# Patient Record
Sex: FEMALE | Race: WHITE | NOT HISPANIC OR LATINO | Employment: OTHER | URBAN - METROPOLITAN AREA
[De-identification: names, ages, dates, MRNs, and addresses within clinical notes are randomized per-mention and may not be internally consistent; named-entity substitution may affect disease eponyms.]

---

## 2019-09-24 ENCOUNTER — RECORDS - HEALTHEAST (OUTPATIENT)
Dept: LAB | Facility: CLINIC | Age: 84
End: 2019-09-24

## 2019-09-24 LAB
ANION GAP SERPL CALCULATED.3IONS-SCNC: 8 MMOL/L (ref 5–18)
BUN SERPL-MCNC: 13 MG/DL (ref 8–28)
CALCIUM SERPL-MCNC: 8.5 MG/DL (ref 8.5–10.5)
CHLORIDE BLD-SCNC: 106 MMOL/L (ref 98–107)
CO2 SERPL-SCNC: 21 MMOL/L (ref 22–31)
CREAT SERPL-MCNC: 0.87 MG/DL (ref 0.6–1.1)
ERYTHROCYTE [DISTWIDTH] IN BLOOD BY AUTOMATED COUNT: 15.8 % (ref 11–14.5)
FERRITIN SERPL-MCNC: 217 NG/ML (ref 10–130)
FOLATE SERPL-MCNC: 9.6 NG/ML
GFR SERPL CREATININE-BSD FRML MDRD: >60 ML/MIN/1.73M2
GLUCOSE BLD-MCNC: 91 MG/DL (ref 70–125)
HCT VFR BLD AUTO: 26.6 % (ref 35–47)
HGB BLD-MCNC: 8.2 G/DL (ref 12–16)
IRON SATN MFR SERPL: 17 % (ref 20–50)
IRON SERPL-MCNC: 32 UG/DL (ref 42–175)
MCH RBC QN AUTO: 31.1 PG (ref 27–34)
MCHC RBC AUTO-ENTMCNC: 30.8 G/DL (ref 32–36)
MCV RBC AUTO: 101 FL (ref 80–100)
PLATELET # BLD AUTO: 427 THOU/UL (ref 140–440)
PMV BLD AUTO: 8.8 FL (ref 8.5–12.5)
POTASSIUM BLD-SCNC: 3.6 MMOL/L (ref 3.5–5)
RBC # BLD AUTO: 2.64 MILL/UL (ref 3.8–5.4)
SODIUM SERPL-SCNC: 135 MMOL/L (ref 136–145)
TIBC SERPL-MCNC: 189 UG/DL (ref 313–563)
TRANSFERRIN SERPL-MCNC: 151 MG/DL (ref 212–360)
VIT B12 SERPL-MCNC: 1223 PG/ML (ref 213–816)
WBC: 9.9 THOU/UL (ref 4–11)

## 2019-10-02 ENCOUNTER — RECORDS - HEALTHEAST (OUTPATIENT)
Dept: LAB | Facility: CLINIC | Age: 84
End: 2019-10-02

## 2019-10-02 LAB — VIT B12 SERPL-MCNC: 706 PG/ML (ref 213–816)

## 2019-11-25 ENCOUNTER — AMBULATORY - HEALTHEAST (OUTPATIENT)
Dept: OTHER | Facility: CLINIC | Age: 84
End: 2019-11-25

## 2019-12-18 ENCOUNTER — RECORDS - HEALTHEAST (OUTPATIENT)
Dept: LAB | Facility: CLINIC | Age: 84
End: 2019-12-18

## 2019-12-18 LAB
ANION GAP SERPL CALCULATED.3IONS-SCNC: 5 MMOL/L (ref 5–18)
BUN SERPL-MCNC: 22 MG/DL (ref 8–28)
CALCIUM SERPL-MCNC: 9.1 MG/DL (ref 8.5–10.5)
CHLORIDE BLD-SCNC: 108 MMOL/L (ref 98–107)
CHOLEST SERPL-MCNC: 142 MG/DL
CO2 SERPL-SCNC: 24 MMOL/L (ref 22–31)
CREAT SERPL-MCNC: 1.01 MG/DL (ref 0.6–1.1)
ERYTHROCYTE [DISTWIDTH] IN BLOOD BY AUTOMATED COUNT: 14.8 % (ref 11–14.5)
FASTING STATUS PATIENT QL REPORTED: NORMAL
FERRITIN SERPL-MCNC: 74 NG/ML (ref 10–130)
GFR SERPL CREATININE-BSD FRML MDRD: 51 ML/MIN/1.73M2
GLUCOSE BLD-MCNC: 94 MG/DL (ref 70–125)
HCT VFR BLD AUTO: 32.3 % (ref 35–47)
HDLC SERPL-MCNC: 55 MG/DL
HGB BLD-MCNC: 9.9 G/DL (ref 12–16)
LDLC SERPL CALC-MCNC: 61 MG/DL
MCH RBC QN AUTO: 31.5 PG (ref 27–34)
MCHC RBC AUTO-ENTMCNC: 30.7 G/DL (ref 32–36)
MCV RBC AUTO: 103 FL (ref 80–100)
PLATELET # BLD AUTO: 236 THOU/UL (ref 140–440)
PMV BLD AUTO: 9.9 FL (ref 8.5–12.5)
POTASSIUM BLD-SCNC: 4.4 MMOL/L (ref 3.5–5)
RBC # BLD AUTO: 3.14 MILL/UL (ref 3.8–5.4)
SODIUM SERPL-SCNC: 137 MMOL/L (ref 136–145)
TRIGL SERPL-MCNC: 128 MG/DL
TSH SERPL DL<=0.005 MIU/L-ACNC: 1.22 UIU/ML (ref 0.3–5)
VIT B12 SERPL-MCNC: 314 PG/ML (ref 213–816)
WBC: 6.3 THOU/UL (ref 4–11)

## 2020-01-27 ENCOUNTER — AMBULATORY - HEALTHEAST (OUTPATIENT)
Dept: OTHER | Facility: CLINIC | Age: 85
End: 2020-01-27

## 2020-07-29 ENCOUNTER — RECORDS - HEALTHEAST (OUTPATIENT)
Dept: LAB | Facility: CLINIC | Age: 85
End: 2020-07-29

## 2020-07-30 LAB
ANION GAP SERPL CALCULATED.3IONS-SCNC: 9 MMOL/L (ref 5–18)
BUN SERPL-MCNC: 18 MG/DL (ref 8–28)
CALCIUM SERPL-MCNC: 8.8 MG/DL (ref 8.5–10.5)
CHLORIDE BLD-SCNC: 100 MMOL/L (ref 98–107)
CO2 SERPL-SCNC: 22 MMOL/L (ref 22–31)
CREAT SERPL-MCNC: 0.99 MG/DL (ref 0.6–1.1)
ERYTHROCYTE [DISTWIDTH] IN BLOOD BY AUTOMATED COUNT: 13.4 % (ref 11–14.5)
FERRITIN SERPL-MCNC: 123 NG/ML (ref 10–130)
GFR SERPL CREATININE-BSD FRML MDRD: 53 ML/MIN/1.73M2
GLUCOSE BLD-MCNC: 80 MG/DL (ref 70–125)
HCT VFR BLD AUTO: 32.1 % (ref 35–47)
HGB BLD-MCNC: 10.2 G/DL (ref 12–16)
MCH RBC QN AUTO: 31.8 PG (ref 27–34)
MCHC RBC AUTO-ENTMCNC: 31.8 G/DL (ref 32–36)
MCV RBC AUTO: 100 FL (ref 80–100)
PLATELET # BLD AUTO: 207 THOU/UL (ref 140–440)
PMV BLD AUTO: 9.5 FL (ref 8.5–12.5)
POTASSIUM BLD-SCNC: 5.1 MMOL/L (ref 3.5–5)
RBC # BLD AUTO: 3.21 MILL/UL (ref 3.8–5.4)
SODIUM SERPL-SCNC: 131 MMOL/L (ref 136–145)
VIT B12 SERPL-MCNC: 296 PG/ML (ref 213–816)
WBC: 5.1 THOU/UL (ref 4–11)

## 2021-03-30 ENCOUNTER — RECORDS - HEALTHEAST (OUTPATIENT)
Dept: LAB | Facility: CLINIC | Age: 86
End: 2021-03-30

## 2021-03-31 LAB
ANION GAP SERPL CALCULATED.3IONS-SCNC: 9 MMOL/L (ref 5–18)
BUN SERPL-MCNC: 20 MG/DL (ref 8–28)
CALCIUM SERPL-MCNC: 8.7 MG/DL (ref 8.5–10.5)
CHLORIDE BLD-SCNC: 109 MMOL/L (ref 98–107)
CO2 SERPL-SCNC: 22 MMOL/L (ref 22–31)
CREAT SERPL-MCNC: 1.05 MG/DL (ref 0.6–1.1)
ERYTHROCYTE [DISTWIDTH] IN BLOOD BY AUTOMATED COUNT: 13.7 % (ref 11–14.5)
FERRITIN SERPL-MCNC: 231 NG/ML (ref 10–130)
GFR SERPL CREATININE-BSD FRML MDRD: 49 ML/MIN/1.73M2
GLUCOSE BLD-MCNC: 84 MG/DL (ref 70–125)
HCT VFR BLD AUTO: 35.2 % (ref 35–47)
HGB BLD-MCNC: 11.1 G/DL (ref 12–16)
MCH RBC QN AUTO: 32.8 PG (ref 27–34)
MCHC RBC AUTO-ENTMCNC: 31.5 G/DL (ref 32–36)
MCV RBC AUTO: 104 FL (ref 80–100)
PLATELET # BLD AUTO: 226 THOU/UL (ref 140–440)
PMV BLD AUTO: 10 FL (ref 8.5–12.5)
POTASSIUM BLD-SCNC: 4.4 MMOL/L (ref 3.5–5)
RBC # BLD AUTO: 3.38 MILL/UL (ref 3.8–5.4)
SODIUM SERPL-SCNC: 140 MMOL/L (ref 136–145)
VIT B12 SERPL-MCNC: 288 PG/ML (ref 213–816)
WBC: 7 THOU/UL (ref 4–11)

## 2021-06-16 PROBLEM — E03.9 ACQUIRED HYPOTHYROIDISM: Status: ACTIVE | Noted: 2019-10-19

## 2021-06-16 PROBLEM — F03.90 DEMENTIA (H): Status: ACTIVE | Noted: 2020-01-21

## 2021-06-16 PROBLEM — S73.004A HIP DISLOCATION, RIGHT, INITIAL ENCOUNTER (H): Status: ACTIVE | Noted: 2020-01-20

## 2021-06-16 PROBLEM — I63.9 ACUTE CVA (CEREBROVASCULAR ACCIDENT) (H): Status: ACTIVE | Noted: 2019-11-23

## 2021-06-16 PROBLEM — N18.30 CHRONIC KIDNEY DISEASE, STAGE III (MODERATE) (H): Status: ACTIVE | Noted: 2017-08-17

## 2021-06-16 PROBLEM — I63.9 ISCHEMIC STROKE (H): Status: ACTIVE | Noted: 2019-11-23

## 2021-06-16 PROBLEM — R09.02 HYPOXIA: Status: ACTIVE | Noted: 2020-01-20

## 2021-06-16 PROBLEM — K62.89 PROCTITIS: Status: ACTIVE | Noted: 2019-10-19

## 2021-06-16 PROBLEM — Z96.649 PERIPROSTHETIC FRACTURE OF PROXIMAL END OF FEMUR: Status: ACTIVE | Noted: 2019-09-13

## 2021-06-16 PROBLEM — A41.89 SEPSIS DUE TO OTHER ETIOLOGY (H): Status: ACTIVE | Noted: 2019-10-19

## 2021-06-16 PROBLEM — M97.8XXA PERIPROSTHETIC FRACTURE OF PROXIMAL END OF FEMUR: Status: ACTIVE | Noted: 2019-09-13

## 2021-06-16 PROBLEM — Z66 DNR (DO NOT RESUSCITATE): Status: ACTIVE | Noted: 2019-07-31

## 2021-06-16 PROBLEM — D53.9 MACROCYTIC ANEMIA: Status: ACTIVE | Noted: 2019-10-19

## 2021-07-30 ENCOUNTER — HOSPITAL ENCOUNTER (EMERGENCY)
Facility: HOSPITAL | Age: 86
Discharge: NURSING, REHAB OR LONG-TERM CARE | End: 2021-07-30
Attending: EMERGENCY MEDICINE | Admitting: EMERGENCY MEDICINE
Payer: COMMERCIAL

## 2021-07-30 ENCOUNTER — APPOINTMENT (OUTPATIENT)
Dept: CT IMAGING | Facility: HOSPITAL | Age: 86
End: 2021-07-30
Attending: EMERGENCY MEDICINE
Payer: COMMERCIAL

## 2021-07-30 VITALS
SYSTOLIC BLOOD PRESSURE: 163 MMHG | RESPIRATION RATE: 16 BRPM | HEART RATE: 54 BPM | TEMPERATURE: 97.8 F | DIASTOLIC BLOOD PRESSURE: 60 MMHG | OXYGEN SATURATION: 97 %

## 2021-07-30 DIAGNOSIS — S01.01XA LACERATION OF SCALP, INITIAL ENCOUNTER: ICD-10-CM

## 2021-07-30 DIAGNOSIS — N30.00 ACUTE CYSTITIS WITHOUT HEMATURIA: ICD-10-CM
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
## 2021-07-30 LAB
ALBUMIN UR-MCNC: NEGATIVE MG/DL
ANION GAP SERPL CALCULATED.3IONS-SCNC: 5 MMOL/L (ref 5–18)
APPEARANCE UR: CLEAR
BACTERIA #/AREA URNS HPF: ABNORMAL /HPF
BASOPHILS # BLD AUTO: 0 10E3/UL (ref 0–0.2)
BASOPHILS NFR BLD AUTO: 1 %
BILIRUB UR QL STRIP: NEGATIVE
BUN SERPL-MCNC: 18 MG/DL (ref 8–28)
CALCIUM SERPL-MCNC: 8.9 MG/DL (ref 8.5–10.5)
CHLORIDE BLD-SCNC: 110 MMOL/L (ref 98–107)
CO2 SERPL-SCNC: 28 MMOL/L (ref 22–31)
COLOR UR AUTO: COLORLESS
CREAT SERPL-MCNC: 1.03 MG/DL (ref 0.6–1.1)
EOSINOPHIL # BLD AUTO: 0.3 10E3/UL (ref 0–0.7)
EOSINOPHIL NFR BLD AUTO: 5 %
ERYTHROCYTE [DISTWIDTH] IN BLOOD BY AUTOMATED COUNT: 13.2 % (ref 10–15)
GFR SERPL CREATININE-BSD FRML MDRD: 48 ML/MIN/1.73M2
GLUCOSE BLD-MCNC: 92 MG/DL (ref 70–125)
GLUCOSE UR STRIP-MCNC: NEGATIVE MG/DL
HCT VFR BLD AUTO: 33.7 % (ref 35–47)
HGB BLD-MCNC: 10.3 G/DL (ref 11.7–15.7)
HGB UR QL STRIP: NEGATIVE
IMM GRANULOCYTES # BLD: 0 10E3/UL
IMM GRANULOCYTES NFR BLD: 1 %
INR PPP: 1.02 (ref 0.9–1.15)
KETONES UR STRIP-MCNC: NEGATIVE MG/DL
LEUKOCYTE ESTERASE UR QL STRIP: ABNORMAL
LYMPHOCYTES # BLD AUTO: 1.4 10E3/UL (ref 0.8–5.3)
LYMPHOCYTES NFR BLD AUTO: 23 %
MCH RBC QN AUTO: 32.3 PG (ref 26.5–33)
MCHC RBC AUTO-ENTMCNC: 30.6 G/DL (ref 31.5–36.5)
MCV RBC AUTO: 106 FL (ref 78–100)
MONOCYTES # BLD AUTO: 0.5 10E3/UL (ref 0–1.3)
MONOCYTES NFR BLD AUTO: 8 %
NEUTROPHILS # BLD AUTO: 3.8 10E3/UL (ref 1.6–8.3)
NEUTROPHILS NFR BLD AUTO: 62 %
NITRATE UR QL: POSITIVE
NRBC # BLD AUTO: 0 10E3/UL
NRBC BLD AUTO-RTO: 0 /100
PH UR STRIP: 6 [PH] (ref 5–7)
PLATELET # BLD AUTO: 191 10E3/UL (ref 150–450)
POTASSIUM BLD-SCNC: 4.1 MMOL/L (ref 3.5–5)
RBC # BLD AUTO: 3.19 10E6/UL (ref 3.8–5.2)
RBC URINE: <1 /HPF
SODIUM SERPL-SCNC: 143 MMOL/L (ref 136–145)
SP GR UR STRIP: 1.01 (ref 1–1.03)
TROPONIN I SERPL-MCNC: 0.02 NG/ML (ref 0–0.29)
UROBILINOGEN UR STRIP-MCNC: <2 MG/DL
WBC # BLD AUTO: 6 10E3/UL (ref 4–11)
WBC URINE: 21 /HPF

## 2021-07-30 PROCEDURE — 36415 COLL VENOUS BLD VENIPUNCTURE: CPT | Performed by: EMERGENCY MEDICINE

## 2021-07-30 PROCEDURE — 72125 CT NECK SPINE W/O DYE: CPT

## 2021-07-30 PROCEDURE — 99285 EMERGENCY DEPT VISIT HI MDM: CPT | Mod: 25

## 2021-07-30 PROCEDURE — 250N000013 HC RX MED GY IP 250 OP 250 PS 637: Performed by: STUDENT IN AN ORGANIZED HEALTH CARE EDUCATION/TRAINING PROGRAM

## 2021-07-30 PROCEDURE — 85610 PROTHROMBIN TIME: CPT | Performed by: EMERGENCY MEDICINE

## 2021-07-30 PROCEDURE — 85025 COMPLETE CBC W/AUTO DIFF WBC: CPT | Performed by: EMERGENCY MEDICINE

## 2021-07-30 PROCEDURE — 70450 CT HEAD/BRAIN W/O DYE: CPT

## 2021-07-30 PROCEDURE — 87086 URINE CULTURE/COLONY COUNT: CPT | Performed by: EMERGENCY MEDICINE

## 2021-07-30 PROCEDURE — 272N000047 HC ADHESIVE DERMABOND SKIN

## 2021-07-30 PROCEDURE — 81001 URINALYSIS AUTO W/SCOPE: CPT | Performed by: EMERGENCY MEDICINE

## 2021-07-30 PROCEDURE — 93005 ELECTROCARDIOGRAM TRACING: CPT | Performed by: EMERGENCY MEDICINE

## 2021-07-30 PROCEDURE — 12002 RPR S/N/AX/GEN/TRNK2.6-7.5CM: CPT

## 2021-07-30 PROCEDURE — 80048 BASIC METABOLIC PNL TOTAL CA: CPT | Performed by: EMERGENCY MEDICINE

## 2021-07-30 PROCEDURE — 93005 ELECTROCARDIOGRAM TRACING: CPT

## 2021-07-30 PROCEDURE — 84484 ASSAY OF TROPONIN QUANT: CPT | Performed by: EMERGENCY MEDICINE

## 2021-07-30 RX ORDER — CEPHALEXIN 500 MG/1
500 CAPSULE ORAL ONCE
Status: COMPLETED | OUTPATIENT
Start: 2021-07-30 | End: 2021-07-30

## 2021-07-30 RX ORDER — CEPHALEXIN 500 MG/1
500 CAPSULE ORAL 4 TIMES DAILY
Qty: 28 CAPSULE | Refills: 0 | Status: SHIPPED | OUTPATIENT
Start: 2021-07-30 | End: 2021-08-06

## 2021-07-30 RX ADMIN — CEPHALEXIN 500 MG: 500 CAPSULE ORAL at 08:18

## 2021-07-30 NOTE — ED PROVIDER NOTES
Received patient on sign out.    HPI    Patient presents with laceration after a fall. She has a pertinent medical history of dementia, sepsis, and stage III chronic kidney disease.     The patient was found in the bathroom after a fall. She lives in a memory care unit. She has a laceration to her head. She is not on blood thinners.          Vitals:    07/30/21 0440 07/30/21 0615 07/30/21 0630 07/30/21 0645   BP: (!) 195/75 (!) 184/79 (!) 152/100 (!) 155/68   Pulse: 53 50 51 50   Resp: 16      Temp: 97.8  F (36.6  C)      SpO2: 94% 95% 95% 96%              ED COURSE AND MEDICAL DECISION MAKING      89-year-old who came from a memory care facility after a fall.  On signout pending urine.    -Urine suggesting infection.  Patient given first dose of Keflex here and discharged home with 7 days of Keflex.  Culture pending        FINAL DIAGNOSIS    UTI, fall       Ohl, Floyd Horta DO  07/30/21 3967

## 2021-07-30 NOTE — ED TRIAGE NOTES
Pt arrives via ems from memory care facility in [place] pt reportedly fell in bathroom, has lac to top of head.

## 2021-07-30 NOTE — ED PROVIDER NOTES
EMERGENCY DEPARTMENT ENCOUNTER      NAME: Saira Schultz  AGE: 89 year old female  YOB: 1929  MRN: [id number]  EVALUATION DATE & TIME: 7/30/2021  4:32 AM    PCP: Nhan April    ED PROVIDER: Vanessa Elliott M.D.      Chief Complaint   Patient presents with     Fall     Laceration         FINAL IMPRESSION:  1. Fall, initial encounter    2. Laceration of scalp, initial encounter    3. Acute cystitis without hematuria        MEDICAL DECISION MAKING:    Pertinent Labs & Imaging studies reviewed. (See chart for details)  ED Course as of Jul 31 0019 Fri Jul 30, 2021   0444 Afebrile.  Vital signs here with hypertension, otherwise within normal limits.  Patient presenting found down in her bathroom at [facility].  Unsure how long she has been down.  Not on any blood thinner medications.  Unsure of loss of consciousness.  Laceration to the top of her head.POLST here says selective treatments with DNR/DNIPhysical exam for patient here without any acute cardiopulmonary distress.  She is large bulky dressing on her head with dried blood on her neck.  She does not report any midline C-spine tenderness on palpation.  Pupils 2 mm equal round reactive to light.  Head eyes able to cross midline.  TMs are clear bilaterally without hemotympanum.  No signs of bony tenderness on palpation of her face.  No chest wall or abdominal tenderness.  No tenderness over palpation of her bony prominences in her upper or lower extremities.  Equal strength bilateral upper and lower extremities.  No signs of facial droop.  Alert and oriented x2, unknown baseline.Patient fall unknown etiology.  Will check some basic labs, EKG, CT scan head neck.  Will attempt to find tetanus.      0613 Head was washed, she does have a 3 cm laceration to the left-sided posterior occiput area.  Tetanus shot within the last 6 years.  Area on head was closed with hair opposition and Dermabond which patient tolerated very well.          CT scans  here without acute process.  Labs here unremarkable, EKG is nonischemic.  Patient at her baseline mental status given her dementia.  We will plan on discharge back to memory care.    At time of signout urinalysis still pending.  Signed out to dayshift pending follow-up of urinalysis, treatment if indicated.    Critical care: 0 minutes excluding separately billable procedures.  Includes bedside management, time reviewing test results, review of records, discussing the case with staff, documenting the medical record and time spent with family members (or surrogate decision makers) discussing specific treatment issues.          ED COURSE:  4:38 AM I met with the patient for the initial interview and physical examination. Discussed plan for treatment and workup in the ED.    6:02 AM I performed a laceration repair.  The importance of close follow up was discussed. We reviewed warning signs and symptoms, and I instructed Ms. Schultz to return to the emergency department immediately if she develops any new or worsening symptoms. I provided additional verbal discharge instructions. Ms. Schultz expressed understanding and agreement with this plan of care, her questions were answered, and she was discharged in stable condition.     PPE: Provider wore gloves, N95 mask, eye protection, surgical cap, and paper mask.   MEDICATIONS GIVEN IN THE EMERGENCY:  Medications   cephALEXin (KEFLEX) capsule 500 mg (500 mg Oral Given 7/30/21 0818)       NEW PRESCRIPTIONS STARTED AT TODAY'S ER VISIT:  Discharge Medication List as of 7/30/2021  8:00 AM      START taking these medications    Details   cephALEXin (KEFLEX) 500 MG capsule Take 1 capsule (500 mg) by mouth 4 times daily for 7 days, Disp-28 capsule, R-0, Local Print                =================================================================    HPI  HPI LIMITED due to dementia    Patient information was obtained from: Nursing Staff    Use of : N/KIKE HOGAN  nAtoine is a 89 year old female with a pertient medical history of dementia, sepsis, stage III Chronic Kidney Disease who presents to the ED via EMS for evaluation of a fall and associated laceration.    Per Nursing Staff, the patient arrives via EMS from a memory care unit where she was found in the bathroom after a fall. The patient currently has a laceration to her head. She is not on blood thinners.    Per chart review, her last tetanus shot was in 2014.     REVIEW OF SYSTEMS   Review of Systems   Unable to perform ROS: Dementia         PAST MEDICAL HISTORY:  History reviewed. No pertinent past medical history.    PAST SURGICAL HISTORY:  History reviewed. No pertinent surgical history.    CURRENT MEDICATIONS:    No current facility-administered medications for this encounter.    Current Outpatient Medications:      cephALEXin (KEFLEX) 500 MG capsule, Take 1 capsule (500 mg) by mouth 4 times daily for 7 days, Disp: 28 capsule, Rfl: 0     acetaminophen (TYLENOL) 325 MG tablet, [ACETAMINOPHEN (TYLENOL) 325 MG TABLET] Take 975 mg by mouth 3 (three) times a day.       , Disp: , Rfl:      aspirin 325 MG tablet, [ASPIRIN 325 MG TABLET] Take 325 mg by mouth daily. , Disp: , Rfl:      atorvastatin (LIPITOR) 40 MG tablet, [ATORVASTATIN (LIPITOR) 40 MG TABLET] Take 40 mg by mouth at bedtime., Disp: , Rfl:      calcium, as carbonate, (TUMS) 200 mg calcium (500 mg) chewable tablet, [CALCIUM, AS CARBONATE, (TUMS) 200 MG CALCIUM (500 MG) CHEWABLE TABLET] Chew 1-2 tablets daily as needed for heartburn. , Disp: , Rfl:      cholecalciferol, vitamin D3, 1,000 unit (25 mcg) tablet, [CHOLECALCIFEROL, VITAMIN D3, 1,000 UNIT (25 MCG) TABLET] Take 2,000 Units by mouth at bedtime., Disp: , Rfl:      clopidogrel (PLAVIX) 75 mg tablet, [CLOPIDOGREL (PLAVIX) 75 MG TABLET] Take 75 mg by mouth daily., Disp: , Rfl:      escitalopram oxalate (LEXAPRO) 5 MG tablet, [ESCITALOPRAM OXALATE (LEXAPRO) 5 MG TABLET] Take 5 mg by mouth daily., Disp: ,  Rfl:      ferrous sulfate 325 (65 FE) MG tablet, [FERROUS SULFATE 325 (65 FE) MG TABLET] Take 1 tablet by mouth daily with breakfast., Disp: , Rfl:      levothyroxine (SYNTHROID, LEVOTHROID) 88 MCG tablet, [LEVOTHYROXINE (SYNTHROID, LEVOTHROID) 88 MCG TABLET] Take 88 mcg by mouth Daily at 6:00 am., Disp: , Rfl:      metoprolol tartrate (LOPRESSOR) 25 MG tablet, [METOPROLOL TARTRATE (LOPRESSOR) 25 MG TABLET] Take 12.5 mg by mouth 2 (two) times a day., Disp: , Rfl:      omeprazole (PRILOSEC) 20 MG capsule, [OMEPRAZOLE (PRILOSEC) 20 MG CAPSULE] Take 20 mg by mouth 2 (two) times a day before meals., Disp: , Rfl:      ondansetron (ZOFRAN-ODT) 4 MG disintegrating tablet, [ONDANSETRON (ZOFRAN-ODT) 4 MG DISINTEGRATING TABLET] Take 4 mg by mouth every 8 (eight) hours as needed for nausea., Disp: , Rfl:      polyethylene glycol (MIRALAX) 17 gram packet, [POLYETHYLENE GLYCOL (MIRALAX) 17 GRAM PACKET] Take 17 g by mouth daily., Disp: , Rfl:      senna-docusate (PERICOLACE) 8.6-50 mg tablet, [SENNA-DOCUSATE (PERICOLACE) 8.6-50 MG TABLET] Take 2 tablets by mouth at bedtime., Disp: , Rfl: 0     triamcinolone (KENALOG) 0.1 % cream, [TRIAMCINOLONE (KENALOG) 0.1 % CREAM] Apply 1 application topically 2 (two) times a day as needed (Rash)., Disp: , Rfl:     ALLERGIES:  No Known Allergies    FAMILY HISTORY:  No family history on file.    SOCIAL HISTORY:   Social History     Socioeconomic History     Marital status:      Spouse name: Not on file     Number of children: Not on file     Years of education: Not on file     Highest education level: Not on file   Occupational History     Not on file   Tobacco Use     Smoking status: Never Smoker   Substance and Sexual Activity     Alcohol use: No     Drug use: No     Sexual activity: Not on file   Other Topics Concern     Not on file   Social History Narrative    10/2019: She resides in [facility].      Social Determinants of Health     Financial Resource  Strain:      Difficulty of Paying Living Expenses:    Food Insecurity:      Worried About Running Out of Food in the Last Year:      Ran Out of Food in the Last Year:    Transportation Needs:      Lack of Transportation (Medical):      Lack of Transportation (Non-Medical):    Physical Activity:      Days of Exercise per Week:      Minutes of Exercise per Session:    Stress:      Feeling of Stress :    Social Connections:      Frequency of Communication with Friends and Family:      Frequency of Social Gatherings with Friends and Family:      Attends [employer] Services:      Active Member of Clubs or Organizations:      Attends Club or Organization Meetings:      Marital Status:    Intimate Partner Violence:      Fear of Current or Ex-Partner:      Emotionally Abused:      Physically Abused:      Sexually Abused:        PHYSICAL EXAM:    Vitals: BP (!) 163/60   Pulse 54   Temp 97.8  F (36.6  C)   Resp 16   SpO2 97%    General:. Alert and interactive, comfortable appearing.  HENT: Oropharynx without erythema or exudates. MMM.  TMs are clear bilaterally without hemotympanum.  No signs of bony tenderness on palpation of her face.  Eyes: Pupils 2 mm equal round reactive to light.  Head eyes able to cross midline.  Neck: Full AROM.  No midline tenderness to palpation. Large bulky dressing on her head with dried blood on her neck.  She does not report any midline C-spine tenderness on palpation.  Cardiovascular: Regular rate and rhythm. Peripheral pulses 2+ bilaterally. No acute cardiopulmonary distress  Chest/Pulmonary: Normal work of breathing. Lung sounds clear and equal throughout, no wheezes or crackles. No chest wall tenderness or deformities.  Abdomen: Soft, nondistended. Nontender without guarding or rebound.  Back/Spine: No CVA or midline tenderness.  Extremities: Normal ROM of all major joints. No lower extremity edema. No tenderness over palpation of her bony prominences in her upper or lower extremities.    Skin: Warm and dry. Normal skin color.   Neuro: CNs grossly intact. Moves all extremities appropriately. Strength and sensation grossly intact to all extremities. No signs of facial droop.  Alert and oriented x2, unknown baseline  Psych: Normal affect/mood, cooperative, memory appropriate.     LAB:  All pertinent labs reviewed and interpreted.  Labs Ordered and Resulted from Time of ED Arrival Up to the Time of Departure from the ED   BASIC METABOLIC PANEL - Abnormal; Notable for the following components:       Result Value    Chloride 110 (*)     GFR Estimate 48 (*)     All other components within normal limits   CBC WITH PLATELETS AND DIFFERENTIAL - Abnormal; Notable for the following components:    RBC Count 3.19 (*)     Hemoglobin 10.3 (*)     Hematocrit 33.7 (*)      (*)     MCHC 30.6 (*)     All other components within normal limits   ROUTINE UA WITH MICROSCOPIC REFLEX TO CULTURE - Abnormal; Notable for the following components:    Nitrite Urine Positive (*)     Leukocyte Esterase Urine 250 Messi/uL (*)     Bacteria Urine Moderate (*)     WBC Urine 21 (*)     All other components within normal limits    Narrative:     Urine Culture ordered based on laboratory criteria   TROPONIN I - Normal   INR - Normal   URINE CULTURE   CBC WITH PLATELETS & DIFFERENTIAL    Narrative:     The following orders were created for panel order CBC with platelets + differential.  Procedure                               Abnormality         Status                     ---------                               -----------         ------                     CBC with platelets and d...[[id number]]  Abnormal            Final result                 Please view results for these tests on the individual orders.       RADIOLOGY:  Cervical spine CT w/o contrast   Final Result   IMPRESSION:   1.  No acute fracture or posttraumatic subluxation.   2.  Chronic T2 superior endplate compression fracture deformity.   3.  Multilevel degenerative  changes, as above.                    Head CT w/o contrast   Final Result   IMPRESSION:   1.  No CT evidence for acute intracranial process.   2.  Brain atrophy and chronic ischemic changes as above.   3.  Left posterior scalp soft tissue swelling/contusion.          EKG:  See MDM        PROCEDURES:   PROCEDURE: Laceration Repair   INDICATIONS: Laceration   PROCEDURE PROVIDER: Dr. Shelly Elliott MD    SITE: scalp   TYPE/SIZE: simple, clean and no foreign body visualized  3 cm (total length)   FUNCTIONAL ASSESSMENT: Distal sensation, circulation, motor and tendon function intact   MEDICATION: none   PREPARATION: scrubbing with Normal saline   DEBRIDEMENT: no debridement   CLOSURE:  Wound was closed in   Dermabond (medical glue)    Total number of sutures/staples placed: 0             I, Vitaliy Chew, am serving as a scribe to document services personally performed by Dr. Vanessa Elliott  based on my observation and the provider's statements to me. I, Vanessa Elliott MD attest that Vitaliy Chew is acting in a scribe capacity, has observed my performance of the services and has documented them in accordance with my direction.      Vanessa Elliott M.D.  Emergency Medicine  [facility] EMERGENCY DEPARTMENT  [address] [place] [id number]  [phone]  Dept: [phone]       Shelly Elliott MD  07/31/21 0020

## 2021-07-30 NOTE — ED NOTES
Spoke to Roro a caregiver at the nursing home that pt resides at to advise that pt will be coming back.    Also gave an update to daughter Janelle Bauman.

## 2021-08-01 LAB — BACTERIA UR CULT: ABNORMAL

## 2021-08-02 LAB
ATRIAL RATE - MUSE: 54 BPM
DIASTOLIC BLOOD PRESSURE - MUSE: NORMAL MMHG
INTERPRETATION ECG - MUSE: NORMAL
P AXIS - MUSE: NORMAL DEGREES
PR INTERVAL - MUSE: NORMAL MS
QRS DURATION - MUSE: 102 MS
QT - MUSE: 482 MS
QTC - MUSE: 461 MS
R AXIS - MUSE: -36 DEGREES
SYSTOLIC BLOOD PRESSURE - MUSE: NORMAL MMHG
T AXIS - MUSE: 75 DEGREES
VENTRICULAR RATE- MUSE: 55 BPM

## 2022-01-01 ENCOUNTER — LAB REQUISITION (OUTPATIENT)
Dept: LAB | Facility: CLINIC | Age: 87
End: 2022-01-01
Payer: COMMERCIAL

## 2022-01-01 ENCOUNTER — APPOINTMENT (OUTPATIENT)
Dept: RADIOLOGY | Facility: HOSPITAL | Age: 87
End: 2022-01-01
Attending: EMERGENCY MEDICINE
Payer: COMMERCIAL

## 2022-01-01 ENCOUNTER — APPOINTMENT (OUTPATIENT)
Dept: CT IMAGING | Facility: HOSPITAL | Age: 87
End: 2022-01-01
Attending: EMERGENCY MEDICINE
Payer: COMMERCIAL

## 2022-01-01 ENCOUNTER — APPOINTMENT (OUTPATIENT)
Dept: CT IMAGING | Facility: HOSPITAL | Age: 87
End: 2022-01-01
Payer: COMMERCIAL

## 2022-01-01 ENCOUNTER — DOCUMENTATION ONLY (OUTPATIENT)
Dept: OTHER | Facility: CLINIC | Age: 87
End: 2022-01-01

## 2022-01-01 ENCOUNTER — APPOINTMENT (OUTPATIENT)
Dept: PHYSICAL THERAPY | Facility: HOSPITAL | Age: 87
End: 2022-01-01
Payer: COMMERCIAL

## 2022-01-01 ENCOUNTER — PATIENT OUTREACH (OUTPATIENT)
Dept: CARE COORDINATION | Facility: CLINIC | Age: 87
End: 2022-01-01

## 2022-01-01 ENCOUNTER — HOSPITAL ENCOUNTER (OUTPATIENT)
Facility: HOSPITAL | Age: 87
Setting detail: OBSERVATION
Discharge: NURSING, REHAB OR LONG-TERM CARE | End: 2022-09-09
Attending: EMERGENCY MEDICINE | Admitting: EMERGENCY MEDICINE
Payer: COMMERCIAL

## 2022-01-01 ENCOUNTER — APPOINTMENT (OUTPATIENT)
Dept: OCCUPATIONAL THERAPY | Facility: HOSPITAL | Age: 87
End: 2022-01-01
Payer: COMMERCIAL

## 2022-01-01 VITALS
WEIGHT: 150 LBS | BODY MASS INDEX: 24.99 KG/M2 | DIASTOLIC BLOOD PRESSURE: 74 MMHG | OXYGEN SATURATION: 96 % | HEART RATE: 70 BPM | SYSTOLIC BLOOD PRESSURE: 160 MMHG | HEIGHT: 65 IN | RESPIRATION RATE: 16 BRPM | TEMPERATURE: 99.1 F

## 2022-01-01 DIAGNOSIS — I63.9 ACUTE CVA (CEREBROVASCULAR ACCIDENT) (H): Primary | ICD-10-CM

## 2022-01-01 DIAGNOSIS — I10 ESSENTIAL (PRIMARY) HYPERTENSION: ICD-10-CM

## 2022-01-01 DIAGNOSIS — R35.0 FREQUENCY OF MICTURITION: ICD-10-CM

## 2022-01-01 DIAGNOSIS — R26.2 DIFFICULTY WALKING: ICD-10-CM

## 2022-01-01 DIAGNOSIS — D64.9 ANEMIA, UNSPECIFIED: ICD-10-CM

## 2022-01-01 DIAGNOSIS — N17.9 ACUTE KIDNEY INJURY (H): ICD-10-CM
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
## 2022-01-01 DIAGNOSIS — E87.5 HYPERKALEMIA: ICD-10-CM

## 2022-01-01 LAB
ALBUMIN SERPL-MCNC: 3.1 G/DL (ref 3.5–5)
ALBUMIN UR-MCNC: NEGATIVE MG/DL
ALP SERPL-CCNC: 57 U/L (ref 45–120)
ALT SERPL W P-5'-P-CCNC: <9 U/L (ref 0–45)
ANION GAP SERPL CALCULATED.3IONS-SCNC: 6 MMOL/L (ref 5–18)
ANION GAP SERPL CALCULATED.3IONS-SCNC: 6 MMOL/L (ref 5–18)
ANION GAP SERPL CALCULATED.3IONS-SCNC: 7 MMOL/L (ref 7–15)
ANION GAP SERPL CALCULATED.3IONS-SCNC: 8 MMOL/L (ref 5–18)
APPEARANCE UR: CLEAR
AST SERPL W P-5'-P-CCNC: 14 U/L (ref 0–40)
ATRIAL RATE - MUSE: 53 BPM
BACTERIA #/AREA URNS HPF: ABNORMAL /HPF
BACTERIA UR CULT: ABNORMAL
BASOPHILS # BLD AUTO: 0 10E3/UL (ref 0–0.2)
BASOPHILS NFR BLD AUTO: 1 %
BILIRUB SERPL-MCNC: 0.2 MG/DL (ref 0–1)
BILIRUB UR QL STRIP: NEGATIVE
BUN SERPL-MCNC: 22 MG/DL (ref 8–28)
BUN SERPL-MCNC: 26 MG/DL (ref 8–28)
BUN SERPL-MCNC: 27.4 MG/DL (ref 8–23)
BUN SERPL-MCNC: 33 MG/DL (ref 8–28)
CALCIUM SERPL-MCNC: 8.3 MG/DL (ref 8.5–10.5)
CALCIUM SERPL-MCNC: 8.3 MG/DL (ref 8.5–10.5)
CALCIUM SERPL-MCNC: 8.6 MG/DL (ref 8.5–10.5)
CALCIUM SERPL-MCNC: 9 MG/DL (ref 8.2–9.6)
CHLORIDE BLD-SCNC: 112 MMOL/L (ref 98–107)
CHLORIDE BLD-SCNC: 112 MMOL/L (ref 98–107)
CHLORIDE BLD-SCNC: 113 MMOL/L (ref 98–107)
CHLORIDE SERPL-SCNC: 112 MMOL/L (ref 98–107)
CO2 SERPL-SCNC: 19 MMOL/L (ref 22–31)
CO2 SERPL-SCNC: 20 MMOL/L (ref 22–31)
CO2 SERPL-SCNC: 24 MMOL/L (ref 22–31)
COLOR UR AUTO: ABNORMAL
CREAT SERPL-MCNC: 1.07 MG/DL (ref 0.6–1.1)
CREAT SERPL-MCNC: 1.1 MG/DL (ref 0.6–1.1)
CREAT SERPL-MCNC: 1.18 MG/DL (ref 0.51–0.95)
CREAT SERPL-MCNC: 1.27 MG/DL (ref 0.6–1.1)
CREAT SERPL-MCNC: 1.49 MG/DL (ref 0.6–1.1)
DEPRECATED HCO3 PLAS-SCNC: 25 MMOL/L (ref 22–29)
DIASTOLIC BLOOD PRESSURE - MUSE: 83 MMHG
EOSINOPHIL # BLD AUTO: 0 10E3/UL (ref 0–0.7)
EOSINOPHIL NFR BLD AUTO: 0 %
ERYTHROCYTE [DISTWIDTH] IN BLOOD BY AUTOMATED COUNT: 14.4 % (ref 10–15)
ERYTHROCYTE [DISTWIDTH] IN BLOOD BY AUTOMATED COUNT: 14.6 % (ref 10–15)
GFR SERPL CREATININE-BSD FRML MDRD: 32 ML/MIN/1.73M2
GFR SERPL CREATININE-BSD FRML MDRD: 39 ML/MIN/1.73M2
GFR SERPL CREATININE-BSD FRML MDRD: 43 ML/MIN/1.73M2
GFR SERPL CREATININE-BSD FRML MDRD: 47 ML/MIN/1.73M2
GFR SERPL CREATININE-BSD FRML MDRD: 48 ML/MIN/1.73M2
GLUCOSE BLD-MCNC: 113 MG/DL (ref 70–125)
GLUCOSE BLD-MCNC: 123 MG/DL (ref 70–125)
GLUCOSE BLD-MCNC: 79 MG/DL (ref 70–125)
GLUCOSE SERPL-MCNC: 87 MG/DL (ref 70–99)
GLUCOSE UR STRIP-MCNC: NEGATIVE MG/DL
HCT VFR BLD AUTO: 32.4 % (ref 35–47)
HCT VFR BLD AUTO: 34.1 % (ref 35–47)
HGB BLD-MCNC: 10.3 G/DL (ref 11.7–15.7)
HGB BLD-MCNC: 10.6 G/DL (ref 11.7–15.7)
HGB BLD-MCNC: 9.9 G/DL (ref 11.7–15.7)
HGB UR QL STRIP: NEGATIVE
IMM GRANULOCYTES # BLD: 0 10E3/UL
IMM GRANULOCYTES NFR BLD: 0 %
INTERPRETATION ECG - MUSE: NORMAL
KETONES UR STRIP-MCNC: NEGATIVE MG/DL
LEUKOCYTE ESTERASE UR QL STRIP: ABNORMAL
LYMPHOCYTES # BLD AUTO: 1.2 10E3/UL (ref 0.8–5.3)
LYMPHOCYTES NFR BLD AUTO: 14 %
MCH RBC QN AUTO: 31.4 PG (ref 26.5–33)
MCH RBC QN AUTO: 31.9 PG (ref 26.5–33)
MCHC RBC AUTO-ENTMCNC: 30.2 G/DL (ref 31.5–36.5)
MCHC RBC AUTO-ENTMCNC: 30.6 G/DL (ref 31.5–36.5)
MCV RBC AUTO: 104 FL (ref 78–100)
MCV RBC AUTO: 105 FL (ref 78–100)
MONOCYTES # BLD AUTO: 0.6 10E3/UL (ref 0–1.3)
MONOCYTES NFR BLD AUTO: 7 %
MUCOUS THREADS #/AREA URNS LPF: PRESENT /LPF
NEUTROPHILS # BLD AUTO: 6.7 10E3/UL (ref 1.6–8.3)
NEUTROPHILS NFR BLD AUTO: 78 %
NITRATE UR QL: POSITIVE
NRBC # BLD AUTO: 0 10E3/UL
NRBC BLD AUTO-RTO: 0 /100
P AXIS - MUSE: 73 DEGREES
PH UR STRIP: 6 [PH] (ref 5–7)
PLATELET # BLD AUTO: 150 10E3/UL (ref 150–450)
PLATELET # BLD AUTO: 157 10E3/UL (ref 150–450)
POTASSIUM BLD-SCNC: 4.6 MMOL/L (ref 3.5–5)
POTASSIUM BLD-SCNC: 4.7 MMOL/L (ref 3.5–5)
POTASSIUM BLD-SCNC: 4.9 MMOL/L (ref 3.5–5)
POTASSIUM BLD-SCNC: 5.8 MMOL/L (ref 3.5–5)
POTASSIUM SERPL-SCNC: 4.5 MMOL/L (ref 3.4–5.3)
PR INTERVAL - MUSE: 310 MS
PROT SERPL-MCNC: 5.2 G/DL (ref 6–8)
QRS DURATION - MUSE: 106 MS
QT - MUSE: 480 MS
QTC - MUSE: 450 MS
R AXIS - MUSE: -39 DEGREES
RBC # BLD AUTO: 3.1 10E6/UL (ref 3.8–5.2)
RBC # BLD AUTO: 3.28 10E6/UL (ref 3.8–5.2)
RBC URINE: 2 /HPF
SARS-COV-2 RNA RESP QL NAA+PROBE: NEGATIVE
SODIUM SERPL-SCNC: 138 MMOL/L (ref 136–145)
SODIUM SERPL-SCNC: 140 MMOL/L (ref 136–145)
SODIUM SERPL-SCNC: 142 MMOL/L (ref 136–145)
SODIUM SERPL-SCNC: 144 MMOL/L (ref 136–145)
SP GR UR STRIP: 1.02 (ref 1–1.03)
SQUAMOUS EPITHELIAL: 3 /HPF
SYSTOLIC BLOOD PRESSURE - MUSE: 214 MMHG
T AXIS - MUSE: 91 DEGREES
TSH SERPL DL<=0.005 MIU/L-ACNC: 1.28 UIU/ML (ref 0.3–4.2)
UROBILINOGEN UR STRIP-MCNC: <2 MG/DL
VENTRICULAR RATE- MUSE: 53 BPM
WBC # BLD AUTO: 6.6 10E3/UL (ref 4–11)
WBC # BLD AUTO: 8.5 10E3/UL (ref 4–11)
WBC URINE: 49 /HPF

## 2022-01-01 PROCEDURE — 82565 ASSAY OF CREATININE: CPT

## 2022-01-01 PROCEDURE — 250N000013 HC RX MED GY IP 250 OP 250 PS 637: Performed by: EMERGENCY MEDICINE

## 2022-01-01 PROCEDURE — 73562 X-RAY EXAM OF KNEE 3: CPT | Mod: RT

## 2022-01-01 PROCEDURE — 36415 COLL VENOUS BLD VENIPUNCTURE: CPT | Performed by: EMERGENCY MEDICINE

## 2022-01-01 PROCEDURE — 84132 ASSAY OF SERUM POTASSIUM: CPT

## 2022-01-01 PROCEDURE — 80053 COMPREHEN METABOLIC PANEL: CPT | Mod: ORL | Performed by: PHYSICIAN ASSISTANT

## 2022-01-01 PROCEDURE — 96361 HYDRATE IV INFUSION ADD-ON: CPT

## 2022-01-01 PROCEDURE — 73502 X-RAY EXAM HIP UNI 2-3 VIEWS: CPT

## 2022-01-01 PROCEDURE — 36415 COLL VENOUS BLD VENIPUNCTURE: CPT

## 2022-01-01 PROCEDURE — 87088 URINE BACTERIA CULTURE: CPT | Mod: ORL | Performed by: INTERNAL MEDICINE

## 2022-01-01 PROCEDURE — 258N000003 HC RX IP 258 OP 636

## 2022-01-01 PROCEDURE — 36415 COLL VENOUS BLD VENIPUNCTURE: CPT | Mod: ORL | Performed by: INTERNAL MEDICINE

## 2022-01-01 PROCEDURE — 36415 COLL VENOUS BLD VENIPUNCTURE: CPT | Mod: ORL | Performed by: PHYSICIAN ASSISTANT

## 2022-01-01 PROCEDURE — 93005 ELECTROCARDIOGRAM TRACING: CPT | Performed by: EMERGENCY MEDICINE

## 2022-01-01 PROCEDURE — 97162 PT EVAL MOD COMPLEX 30 MIN: CPT | Mod: GP | Performed by: PHYSICAL THERAPIST

## 2022-01-01 PROCEDURE — 96360 HYDRATION IV INFUSION INIT: CPT

## 2022-01-01 PROCEDURE — 97535 SELF CARE MNGMENT TRAINING: CPT | Mod: GO

## 2022-01-01 PROCEDURE — 99285 EMERGENCY DEPT VISIT HI MDM: CPT | Mod: 25

## 2022-01-01 PROCEDURE — P9604 ONE-WAY ALLOW PRORATED TRIP: HCPCS | Mod: ORL | Performed by: INTERNAL MEDICINE

## 2022-01-01 PROCEDURE — U0005 INFEC AGEN DETEC AMPLI PROBE: HCPCS | Performed by: EMERGENCY MEDICINE

## 2022-01-01 PROCEDURE — G0378 HOSPITAL OBSERVATION PER HR: HCPCS

## 2022-01-01 PROCEDURE — 71045 X-RAY EXAM CHEST 1 VIEW: CPT

## 2022-01-01 PROCEDURE — 80048 BASIC METABOLIC PNL TOTAL CA: CPT

## 2022-01-01 PROCEDURE — 85018 HEMOGLOBIN: CPT | Mod: ORL | Performed by: INTERNAL MEDICINE

## 2022-01-01 PROCEDURE — 96375 TX/PRO/DX INJ NEW DRUG ADDON: CPT

## 2022-01-01 PROCEDURE — 72125 CT NECK SPINE W/O DYE: CPT

## 2022-01-01 PROCEDURE — 73700 CT LOWER EXTREMITY W/O DYE: CPT | Mod: RT

## 2022-01-01 PROCEDURE — 70450 CT HEAD/BRAIN W/O DYE: CPT

## 2022-01-01 PROCEDURE — 97530 THERAPEUTIC ACTIVITIES: CPT | Mod: GP | Performed by: PHYSICAL THERAPIST

## 2022-01-01 PROCEDURE — 80048 BASIC METABOLIC PNL TOTAL CA: CPT | Performed by: EMERGENCY MEDICINE

## 2022-01-01 PROCEDURE — 258N000003 HC RX IP 258 OP 636: Performed by: EMERGENCY MEDICINE

## 2022-01-01 PROCEDURE — 250N000013 HC RX MED GY IP 250 OP 250 PS 637

## 2022-01-01 PROCEDURE — 81001 URINALYSIS AUTO W/SCOPE: CPT | Mod: ORL | Performed by: INTERNAL MEDICINE

## 2022-01-01 PROCEDURE — 96374 THER/PROPH/DIAG INJ IV PUSH: CPT

## 2022-01-01 PROCEDURE — 85014 HEMATOCRIT: CPT | Performed by: EMERGENCY MEDICINE

## 2022-01-01 PROCEDURE — 250N000011 HC RX IP 250 OP 636

## 2022-01-01 PROCEDURE — 80048 BASIC METABOLIC PNL TOTAL CA: CPT | Mod: ORL | Performed by: INTERNAL MEDICINE

## 2022-01-01 PROCEDURE — 85027 COMPLETE CBC AUTOMATED: CPT

## 2022-01-01 PROCEDURE — C9803 HOPD COVID-19 SPEC COLLECT: HCPCS

## 2022-01-01 PROCEDURE — 84443 ASSAY THYROID STIM HORMONE: CPT | Mod: ORL | Performed by: INTERNAL MEDICINE

## 2022-01-01 PROCEDURE — 99220 PR INITIAL OBSERVATION CARE,LEVEL III: CPT | Mod: GC

## 2022-01-01 PROCEDURE — 96372 THER/PROPH/DIAG INJ SC/IM: CPT

## 2022-01-01 PROCEDURE — 97166 OT EVAL MOD COMPLEX 45 MIN: CPT | Mod: GO

## 2022-01-01 PROCEDURE — P9604 ONE-WAY ALLOW PRORATED TRIP: HCPCS | Mod: ORL | Performed by: PHYSICIAN ASSISTANT

## 2022-01-01 RX ORDER — GABAPENTIN 100 MG/1
200 CAPSULE ORAL 3 TIMES DAILY
Status: DISCONTINUED | OUTPATIENT
Start: 2022-01-01 | End: 2022-01-01 | Stop reason: HOSPADM

## 2022-01-01 RX ORDER — MIRTAZAPINE 15 MG/1
15 TABLET, FILM COATED ORAL AT BEDTIME
COMMUNITY

## 2022-01-01 RX ORDER — ACETAMINOPHEN 325 MG/1
975 TABLET ORAL ONCE
Status: COMPLETED | OUTPATIENT
Start: 2022-01-01 | End: 2022-01-01

## 2022-01-01 RX ORDER — ONDANSETRON 4 MG/1
4 TABLET, ORALLY DISINTEGRATING ORAL EVERY 6 HOURS PRN
Status: DISCONTINUED | OUTPATIENT
Start: 2022-01-01 | End: 2022-01-01 | Stop reason: HOSPADM

## 2022-01-01 RX ORDER — LOPERAMIDE HCL 2 MG
2 CAPSULE ORAL PRN
COMMUNITY

## 2022-01-01 RX ORDER — HYDROMORPHONE HYDROCHLORIDE 1 MG/ML
0.2 INJECTION, SOLUTION INTRAMUSCULAR; INTRAVENOUS; SUBCUTANEOUS ONCE
Status: COMPLETED | OUTPATIENT
Start: 2022-01-01 | End: 2022-01-01

## 2022-01-01 RX ORDER — ATORVASTATIN CALCIUM 40 MG/1
40 TABLET, FILM COATED ORAL AT BEDTIME
Status: DISCONTINUED | OUTPATIENT
Start: 2022-01-01 | End: 2022-01-01 | Stop reason: HOSPADM

## 2022-01-01 RX ORDER — PROCHLORPERAZINE MALEATE 5 MG
5 TABLET ORAL EVERY 6 HOURS PRN
Status: DISCONTINUED | OUTPATIENT
Start: 2022-01-01 | End: 2022-01-01

## 2022-01-01 RX ORDER — LOSARTAN POTASSIUM 50 MG/1
50 TABLET ORAL DAILY
Status: DISCONTINUED | OUTPATIENT
Start: 2022-01-01 | End: 2022-01-01 | Stop reason: HOSPADM

## 2022-01-01 RX ORDER — ENOXAPARIN SODIUM 100 MG/ML
30 INJECTION SUBCUTANEOUS EVERY 24 HOURS
Status: DISCONTINUED | OUTPATIENT
Start: 2022-01-01 | End: 2022-01-01 | Stop reason: HOSPADM

## 2022-01-01 RX ORDER — ACETAMINOPHEN 325 MG/1
650 TABLET ORAL ONCE
Status: COMPLETED | OUTPATIENT
Start: 2022-01-01 | End: 2022-01-01

## 2022-01-01 RX ORDER — GABAPENTIN 100 MG/1
200 CAPSULE ORAL 3 TIMES DAILY
COMMUNITY

## 2022-01-01 RX ORDER — IBUPROFEN 400 MG/1
400 TABLET, FILM COATED ORAL EVERY 6 HOURS PRN
COMMUNITY

## 2022-01-01 RX ORDER — ESCITALOPRAM OXALATE 10 MG/1
10 TABLET ORAL DAILY
Status: DISCONTINUED | OUTPATIENT
Start: 2022-01-01 | End: 2022-01-01 | Stop reason: HOSPADM

## 2022-01-01 RX ORDER — ONDANSETRON 2 MG/ML
4 INJECTION INTRAMUSCULAR; INTRAVENOUS EVERY 6 HOURS PRN
Status: DISCONTINUED | OUTPATIENT
Start: 2022-01-01 | End: 2022-01-01 | Stop reason: HOSPADM

## 2022-01-01 RX ORDER — LOSARTAN POTASSIUM 50 MG/1
50 TABLET ORAL DAILY
COMMUNITY

## 2022-01-01 RX ORDER — LEVOTHYROXINE SODIUM 88 UG/1
88 TABLET ORAL DAILY
Status: DISCONTINUED | OUTPATIENT
Start: 2022-01-01 | End: 2022-01-01 | Stop reason: HOSPADM

## 2022-01-01 RX ORDER — TRAMADOL HYDROCHLORIDE 50 MG/1
50 TABLET ORAL 4 TIMES DAILY PRN
COMMUNITY

## 2022-01-01 RX ORDER — ACETAMINOPHEN 325 MG/1
975 TABLET ORAL 3 TIMES DAILY
Status: DISCONTINUED | OUTPATIENT
Start: 2022-01-01 | End: 2022-01-01 | Stop reason: HOSPADM

## 2022-01-01 RX ORDER — PROCHLORPERAZINE 25 MG
12.5 SUPPOSITORY, RECTAL RECTAL EVERY 12 HOURS PRN
Status: DISCONTINUED | OUTPATIENT
Start: 2022-01-01 | End: 2022-01-01

## 2022-01-01 RX ORDER — ACETAMINOPHEN 325 MG/1
975 TABLET ORAL EVERY 8 HOURS
Status: DISCONTINUED | OUTPATIENT
Start: 2022-01-01 | End: 2022-01-01

## 2022-01-01 RX ORDER — SODIUM CHLORIDE, SODIUM LACTATE, POTASSIUM CHLORIDE, CALCIUM CHLORIDE 600; 310; 30; 20 MG/100ML; MG/100ML; MG/100ML; MG/100ML
INJECTION, SOLUTION INTRAVENOUS ONCE
Status: COMPLETED | OUTPATIENT
Start: 2022-01-01 | End: 2022-01-01

## 2022-01-01 RX ORDER — ASPIRIN 325 MG
325 TABLET ORAL DAILY
Status: DISCONTINUED | OUTPATIENT
Start: 2022-01-01 | End: 2022-01-01 | Stop reason: HOSPADM

## 2022-01-01 RX ORDER — LIDOCAINE 40 MG/G
CREAM TOPICAL
Status: DISCONTINUED | OUTPATIENT
Start: 2022-01-01 | End: 2022-01-01 | Stop reason: HOSPADM

## 2022-01-01 RX ORDER — SODIUM CHLORIDE, SODIUM LACTATE, POTASSIUM CHLORIDE, CALCIUM CHLORIDE 600; 310; 30; 20 MG/100ML; MG/100ML; MG/100ML; MG/100ML
INJECTION, SOLUTION INTRAVENOUS CONTINUOUS
Status: ACTIVE | OUTPATIENT
Start: 2022-01-01 | End: 2022-01-01

## 2022-01-01 RX ORDER — CLOPIDOGREL BISULFATE 75 MG/1
75 TABLET ORAL DAILY
Status: DISCONTINUED | OUTPATIENT
Start: 2022-01-01 | End: 2022-01-01 | Stop reason: HOSPADM

## 2022-01-01 RX ORDER — HYDRALAZINE HYDROCHLORIDE 20 MG/ML
10 INJECTION INTRAMUSCULAR; INTRAVENOUS ONCE
Status: COMPLETED | OUTPATIENT
Start: 2022-01-01 | End: 2022-01-01

## 2022-01-01 RX ORDER — AMOXICILLIN 250 MG
2 CAPSULE ORAL 2 TIMES DAILY PRN
Status: DISCONTINUED | OUTPATIENT
Start: 2022-01-01 | End: 2022-01-01 | Stop reason: HOSPADM

## 2022-01-01 RX ORDER — MIRTAZAPINE 15 MG/1
15 TABLET, FILM COATED ORAL AT BEDTIME
Status: DISCONTINUED | OUTPATIENT
Start: 2022-01-01 | End: 2022-01-01 | Stop reason: HOSPADM

## 2022-01-01 RX ORDER — AMOXICILLIN 250 MG
1 CAPSULE ORAL 2 TIMES DAILY PRN
Status: DISCONTINUED | OUTPATIENT
Start: 2022-01-01 | End: 2022-01-01 | Stop reason: HOSPADM

## 2022-01-01 RX ORDER — GABAPENTIN 100 MG/1
100 CAPSULE ORAL PRN
COMMUNITY

## 2022-01-01 RX ADMIN — LEVOTHYROXINE SODIUM 88 MCG: 88 TABLET ORAL at 06:04

## 2022-01-01 RX ADMIN — GABAPENTIN 200 MG: 100 CAPSULE ORAL at 09:26

## 2022-01-01 RX ADMIN — ESCITALOPRAM OXALATE 10 MG: 10 TABLET ORAL at 09:26

## 2022-01-01 RX ADMIN — CLOPIDOGREL BISULFATE 75 MG: 75 TABLET ORAL at 09:26

## 2022-01-01 RX ADMIN — ASPIRIN 325 MG: 325 TABLET ORAL at 09:26

## 2022-01-01 RX ADMIN — HYDROMORPHONE HYDROCHLORIDE 0.2 MG: 1 INJECTION, SOLUTION INTRAMUSCULAR; INTRAVENOUS; SUBCUTANEOUS at 19:39

## 2022-01-01 RX ADMIN — ACETAMINOPHEN 975 MG: 325 TABLET, FILM COATED ORAL at 05:23

## 2022-01-01 RX ADMIN — SODIUM CHLORIDE, POTASSIUM CHLORIDE, SODIUM LACTATE AND CALCIUM CHLORIDE: 600; 310; 30; 20 INJECTION, SOLUTION INTRAVENOUS at 17:57

## 2022-01-01 RX ADMIN — ATORVASTATIN CALCIUM 40 MG: 40 TABLET, FILM COATED ORAL at 22:21

## 2022-01-01 RX ADMIN — METOPROLOL TARTRATE 12.5 MG: 25 TABLET, FILM COATED ORAL at 22:21

## 2022-01-01 RX ADMIN — HYDRALAZINE HYDROCHLORIDE 10 MG: 20 INJECTION INTRAMUSCULAR; INTRAVENOUS at 22:21

## 2022-01-01 RX ADMIN — ENOXAPARIN SODIUM 30 MG: 30 INJECTION SUBCUTANEOUS at 09:25

## 2022-01-01 RX ADMIN — ACETAMINOPHEN 975 MG: 325 TABLET, FILM COATED ORAL at 17:58

## 2022-01-01 RX ADMIN — MIRTAZAPINE 15 MG: 15 TABLET, FILM COATED ORAL at 22:21

## 2022-01-01 RX ADMIN — GABAPENTIN 200 MG: 100 CAPSULE ORAL at 22:21

## 2022-01-01 RX ADMIN — ACETAMINOPHEN 975 MG: 325 TABLET, FILM COATED ORAL at 22:21

## 2022-01-01 RX ADMIN — ACETAMINOPHEN 650 MG: 325 TABLET, FILM COATED ORAL at 12:55

## 2022-01-01 RX ADMIN — SODIUM CHLORIDE 1000 ML: 9 INJECTION, SOLUTION INTRAVENOUS at 14:32

## 2022-01-01 RX ADMIN — SODIUM CHLORIDE, POTASSIUM CHLORIDE, SODIUM LACTATE AND CALCIUM CHLORIDE: 600; 310; 30; 20 INJECTION, SOLUTION INTRAVENOUS at 02:22

## 2022-01-01 ASSESSMENT — ACTIVITIES OF DAILY LIVING (ADL)
ADLS_ACUITY_SCORE: 37
ADLS_ACUITY_SCORE: 37
ADLS_ACUITY_SCORE: 35
ADLS_ACUITY_SCORE: 37
ADLS_ACUITY_SCORE: 35
ADLS_ACUITY_SCORE: 35
ADLS_ACUITY_SCORE: 43
ADLS_ACUITY_SCORE: 37
ADLS_ACUITY_SCORE: 37

## 2022-02-01 ENCOUNTER — LAB REQUISITION (OUTPATIENT)
Dept: LAB | Facility: CLINIC | Age: 87
End: 2022-02-01
Payer: COMMERCIAL

## 2022-02-01 DIAGNOSIS — F03.90 UNSPECIFIED DEMENTIA WITHOUT BEHAVIORAL DISTURBANCE: ICD-10-CM

## 2022-02-02 LAB
ALBUMIN SERPL-MCNC: 3.7 G/DL (ref 3.5–5)
ALP SERPL-CCNC: 68 U/L (ref 45–120)
ALT SERPL W P-5'-P-CCNC: <9 U/L (ref 0–45)
ANION GAP SERPL CALCULATED.3IONS-SCNC: 9 MMOL/L (ref 5–18)
AST SERPL W P-5'-P-CCNC: 17 U/L (ref 0–40)
BILIRUB SERPL-MCNC: 0.4 MG/DL (ref 0–1)
BUN SERPL-MCNC: 26 MG/DL (ref 8–28)
CALCIUM SERPL-MCNC: 9 MG/DL (ref 8.5–10.5)
CHLORIDE BLD-SCNC: 111 MMOL/L (ref 98–107)
CO2 SERPL-SCNC: 19 MMOL/L (ref 22–31)
CREAT SERPL-MCNC: 1.19 MG/DL (ref 0.6–1.1)
ERYTHROCYTE [DISTWIDTH] IN BLOOD BY AUTOMATED COUNT: 14.4 % (ref 10–15)
GFR SERPL CREATININE-BSD FRML MDRD: 43 ML/MIN/1.73M2
GLUCOSE BLD-MCNC: 60 MG/DL (ref 70–125)
HCT VFR BLD AUTO: 34.7 % (ref 35–47)
HGB BLD-MCNC: 10.5 G/DL (ref 11.7–15.7)
MCH RBC QN AUTO: 30.9 PG (ref 26.5–33)
MCHC RBC AUTO-ENTMCNC: 30.3 G/DL (ref 31.5–36.5)
MCV RBC AUTO: 102 FL (ref 78–100)
PLATELET # BLD AUTO: 195 10E3/UL (ref 150–450)
POTASSIUM BLD-SCNC: 5.6 MMOL/L (ref 3.5–5)
PROT SERPL-MCNC: 6.2 G/DL (ref 6–8)
RBC # BLD AUTO: 3.4 10E6/UL (ref 3.8–5.2)
SODIUM SERPL-SCNC: 139 MMOL/L (ref 136–145)
TSH SERPL DL<=0.005 MIU/L-ACNC: 1.29 UIU/ML (ref 0.3–5)
VIT B12 SERPL-MCNC: 378 PG/ML (ref 213–816)
WBC # BLD AUTO: 6.3 10E3/UL (ref 4–11)

## 2022-02-02 PROCEDURE — 84443 ASSAY THYROID STIM HORMONE: CPT | Mod: ORL | Performed by: INTERNAL MEDICINE

## 2022-02-02 PROCEDURE — 82607 VITAMIN B-12: CPT | Mod: ORL | Performed by: INTERNAL MEDICINE

## 2022-02-02 PROCEDURE — 36415 COLL VENOUS BLD VENIPUNCTURE: CPT | Mod: ORL | Performed by: INTERNAL MEDICINE

## 2022-02-02 PROCEDURE — 80053 COMPREHEN METABOLIC PANEL: CPT | Mod: ORL | Performed by: INTERNAL MEDICINE

## 2022-02-02 PROCEDURE — 85027 COMPLETE CBC AUTOMATED: CPT | Mod: ORL | Performed by: INTERNAL MEDICINE

## 2022-09-08 PROBLEM — N17.9 ACUTE KIDNEY INJURY (H): Status: ACTIVE | Noted: 2022-01-01
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
## 2022-09-08 PROBLEM — R26.2 DIFFICULTY WALKING: Status: ACTIVE | Noted: 2022-01-01

## 2022-09-08 NOTE — ED NOTES
Bed: JNED-02  Expected date:   Expected time:   Means of arrival:   Comments:  [facility] 89 [facility] F fall on thinners

## 2022-09-08 NOTE — ED TRIAGE NOTES
Pt presents to [facility] ED alone from NH for fall. Pt was found down on the ground 10 minutes after RN rounded. Pt was on her back and holding her R leg. Pt is on thinners. No LOC. Hx of stroke and aphasic at baseline. Pt is ambulatory independently baseline. Pt moving all extremeties. GCS of 14 on arrival.

## 2022-09-08 NOTE — H&P
[facility]    History and Physical - Hospitalist Service       Date of Admission:  9/8/2022    Assessment & Plan      Saira Schultz is a 89 year old female admitted on 9/8/2022. She is admitted from her [facility] facility where she reportedly had an unwitnessed fall.    Of note, PMH is notable for CVA which affects her speech and ability to communicate subjective information.     #S/p fall   #Decreased ADLs  #Right knee pain   Patient is admitted from her assisted living facility where she reportedly had an unwitnessed fall. Sounds mechanical in nature; no shortness of breath, chest pain, or dizziness preceding fall. Screening imaging done on admission of chest, right hip, and pelvis are negative for any fracture or dislocation.  Head and cervical spine imaging negative. EKG obtained in ED to rule out arhythmia leading to syncope and reassuringly showed sinus rhythm. Patient was set to discharge home but unfortunately failed her ambulation/mobility test and it was felt she should be admitted due to decreased ADLs. On examination, the pain that keeps her from ambulating is localized to her right medial knee. No obvious swelling, erythema, or edema. She is not able to articulate if this pain was present before or after her fall.   - Received one dose of Dilaudid for pain in ED   - PRN Tylenol for pain   - PT/OT consult   - CT (w/o contrast) for further evaluation of possible knee injury given inability to bear weight     # BRIAN   Found to have creatinine of 1.49 (1.18 2 weeks prior) and BUN of 33. Appears most likely pre-renal in nature, however cannot confidently confirm limited PO intake.   - Received fluid bolus in the ED   - Continue 100mL/hr maintenance fluids   - Follow BMP in AM   - Hold PTA losartan   - Follow urinary retention, see below     #Urinary Retention   Upon admission to ED, patient unable to urinate and bladder scan showed 800mL retained fluid. Etiology unclear. Will  attempt to discuss with assisted living staff. For now, due to the discomfort and agitation associated with repositioning with each straight cath, will place order for garland catheter.   - garland catheter     #HTN   Known diagnosis of HTN. Knee pain, pain with repositioning, and urinary retention also likely contributing.   Pain somewhat well controlled withTylenol PRN and one dose of Dilaudid given in the ED. Has had some urinary retention with difficulty and increased pain with repositioning. Needed straight cath x 3 in ED.   - Garland catheter ordered due to difficulty with straight cath   - hydralazine 10 mg ordered for ED notification of /84   - restart PTA metoprolol, hold losartan due to BRIAN     #S/p CVA   #Expressive dysphagia   Patient at reported baseline. No further work up indicated at this time.     # Hyperkalemia   Elevated potassium of 5.8 on admission. Rechecked and improved with fluids.   - BMP in AM      Diet: Combination Diet Regular Diet Adult  DVT Prophylaxis: Lovenox   Garland Catheter: PRESENT, indication:    Fluids: Bolus in ED, maintenance fluids at 100mL/hr   Central Lines: None  Cardiac Monitoring: None  Code Status: DNR/DNI     Clinically Significant Risk Factors Present on Admission                # Platelet Defect: home medication list includes an antiplatelet medication  # Hypertension: home medication list includes antihypertensive(s)          Disposition Plan      The patient's care was discussed with the Attending Physician, Dr. Graham .    Fanny Javier MD   [facility]   Securely message with the Vocera Web Console (learn more here)  Text page via [facility] Paging/Directory   ____________________________________________________________________    Chief Complaint   Fall, mechanical   Right knee pan     History of Present Illness   Saira Schultz is a 89 year old female with a pertinent history of sepsis, CVA, dementia, CKD stage 3, hypertension, PVD, and  varicose veins who presents to this ED via EMS for evaluation of a fall at her assisted living facility. Per nurse at her facility, the patient has dementia at baseline but is usually ambulatory independently. This morning, a nurse at the residential facility checked on her after hearing a loud sound concerning for a fall. Patient was found down on her right side. Denies chest pain, dizziness, or shortness of breath prior to fall. Patient is on blood thinners, with history of stroke and aphasia at baseline. Nurse states that patient was less attentive than usual.    Review of Systems    12 point ROS completed, all negative aside from those noted above.     Past Medical History    I have reviewed this patient's medical history and updated it with pertinent information if needed.   History reviewed. No pertinent past medical history.     Past Surgical History   I have reviewed this patient's surgical history and updated it with pertinent information if needed.  History reviewed. No pertinent surgical history.     Social History   I have reviewed this patient's social history and updated it with pertinent information if needed. Saira Schultz  reports that she has never smoked. She does not have any smokeless tobacco history on file. She reports that she does not drink alcohol and does not use drugs.    Family History   Not on file.     Prior to Admission Medications   Prior to Admission Medications   Prescriptions Last Dose Informant Patient Reported? Taking?   acetaminophen (TYLENOL) 325 MG tablet 9/8/2022 at 0930  Yes Yes   Sig: [ACETAMINOPHEN (TYLENOL) 325 MG TABLET] Take 975 mg by mouth 3 (three) times a day.          aspirin 325 MG tablet 9/8/2022 at 0930  Yes Yes   Sig: [ASPIRIN 325 MG TABLET] Take 325 mg by mouth daily.    atorvastatin (LIPITOR) 40 MG tablet 9/7/2022 at 2000  Yes Yes   Sig: [ATORVASTATIN (LIPITOR) 40 MG TABLET] Take 40 mg by mouth at bedtime.   cholecalciferol, vitamin D3, 1,000 unit (25  "mcg) tablet 9/7/2022 at 2000  Yes Yes   Sig: [CHOLECALCIFEROL, VITAMIN D3, 1,000 UNIT (25 MCG) TABLET] Take 2,000 Units by mouth at bedtime.   clopidogrel (PLAVIX) 75 mg tablet 9/8/2022 at 0930  Yes Yes   Sig: [CLOPIDOGREL (PLAVIX) 75 MG TABLET] Take 75 mg by mouth daily.   escitalopram (LEXAPRO) 10 MG tablet 9/7/2022 at 0930  Yes Yes   Sig: Take 10 mg by mouth daily   gabapentin (NEURONTIN) 100 MG capsule 9/8/2022 at 0930  Yes Yes   Sig: Take 200 mg by mouth 3 times daily   gabapentin (NEURONTIN) 100 MG capsule Past Week at Unknown time  Yes Yes   Sig: Take 100 mg by mouth as needed for other (\"Agitation\")   ibuprofen (ADVIL/MOTRIN) 400 MG tablet Past Week at Unknown time  Yes Yes   Sig: Take 400 mg by mouth every 6 hours as needed for moderate pain   levothyroxine (SYNTHROID, LEVOTHROID) 88 MCG tablet 9/8/2022 at 0730  Yes Yes   Sig: [LEVOTHYROXINE (SYNTHROID, LEVOTHROID) 88 MCG TABLET] Take 88 mcg by mouth Daily at 6:00 am.   loperamide (IMODIUM) 2 MG capsule Past Month at Unknown time  Yes Yes   Sig: Take 2 mg by mouth as needed for diarrhea   losartan (COZAAR) 50 MG tablet 9/8/2022 at 0930  Yes Yes   Sig: Take 50 mg by mouth daily   metoprolol tartrate (LOPRESSOR) 25 MG tablet 9/8/2022 at 0930  Yes Yes   Sig: [METOPROLOL TARTRATE (LOPRESSOR) 25 MG TABLET] Take 12.5 mg by mouth 2 (two) times a day.   mirtazapine (REMERON) 15 MG tablet 9/7/2022 at 2000  Yes Yes   Sig: Take 15 mg by mouth At Bedtime   omeprazole (PRILOSEC) 40 MG DR capsule 9/8/2022 at 0730  Yes Yes   Sig: Take 40 mg by mouth daily   polyethylene glycol (MIRALAX) 17 gram packet 9/8/2022 at 0930  Yes Yes   Sig: [POLYETHYLENE GLYCOL (MIRALAX) 17 GRAM PACKET] Take 17 g by mouth daily.   senna-docusate (PERICOLACE) 8.6-50 mg tablet 9/7/2022 at 2000  No Yes   Sig: [SENNA-DOCUSATE (PERICOLACE) 8.6-50 MG TABLET] Take 2 tablets by mouth at bedtime.   traMADol (ULTRAM) 50 MG tablet Past Week at Unknown time  Yes Yes   Sig: Take 50 mg by mouth 4 times daily " as needed for severe pain      Facility-Administered Medications: None     Allergies   No Known Allergies    Physical Exam   Vital Signs: Temp: 98.8  F (37.1  C) Temp src: Oral BP: (!) 192/134 Pulse: 65   Resp: (!) 45 SpO2: 92 % O2 Device: None (Room air)    Weight: 0 lbs 0 oz  General: Lying comfortably in bed, initially in no acute distress. In distress following R LE exam, see below.   HEENT: Conjunctivae are clear, nonicteric.   Neck: No masses appreciated.  Respiratory: No respiratory distress. Lung sounds are clear without rales, ronchi, or wheezes.   Cardiac: RRR. No m/g/r. Brisk cap refill.  Abdominal: Abdomen is soft and non-tender without distention.   Extremities: Right medial knee with significant pain, unable to move with or without assistance. No erythema, edema, or warmth to area.   Skin: Skin in warm without rashes.  Neurological: Motor function is grossly normal.  Psychiatric: Someone confused, baseline. Oriented to time and place.     Data   Data reviewed today: I reviewed all medications, new labs and imaging results over the last 24 hours    Recent Labs   Lab 09/08/22  1846 09/08/22  1340   WBC  --  8.5   HGB  --  10.3*   MCV  --  104*   PLT  --  157   NA  --  140   POTASSIUM 4.9 5.8*   CHLORIDE  --  112*   CO2  --  20*   BUN  --  33*   CR  --  1.49*   ANIONGAP  --  8   NIYA  --  8.6   GLC  --  113     8.5    \    10.3 (L)    /    157   N 78    L N/A    140    112 (H)    33 (H) /   ------------------------------------ 113   ALT N/A   AST N/A   AP N/A   ALB N/A   Ca 8.6  4.9    20 (L)    1.49 (H) \    % RETIC N/A    LDH N/A  Troponin N/A    BNP N/A    CK N/A  INR N/A   PTT N/A    D-dimer N/A    Fibrinogen N/A    Antithrombin N/A  Ferritin N/A  CRP N/A    IL-6 N/A  Recent Results (from the past 24 hour(s))   Head CT w/o contrast    Narrative    EXAM: CT HEAD W/O CONTRAST, CT CERVICAL SPINE W/O CONTRAST  LOCATION: [facility]  DATE/TIME: 9/8/2022 1:24 PM    INDICATION:  Fall, head injury, trauma.   COMPARISON: Head CT and cervical spine CT 07/30/2021  TECHNIQUE:   1) Routine CT Head without IV contrast. Multiplanar reformats. Dose reduction techniques were used.  2) Routine CT Cervical Spine without IV contrast. Multiplanar reformats. Dose reduction techniques were used.    FINDINGS:   HEAD CT:   INTRACRANIAL CONTENTS: No intracranial hemorrhage. Mild diffuse cerebral parenchymal volume loss. No midline shift. The basilar cisterns are patent. Large area of encephalomalacia involving the left temporal lobe and the left parietal lobe. There is   associated ex vacuo dilatation of the left lateral ventricle. Mild periventricular and scattered foci of deep white matter hypodensities involving both cerebral hemispheres. Chronic lacunae in the right caudate head. No CT evidence for an acute infarct.   Redemonstration of couple of punctate foci of hyperdensities within the subarachnoid spaces including the posterior right sylvian fissure, posterior right frontal lobe and the right cerebellar hemisphere.    VISUALIZED ORBITS/SINUSES/MASTOIDS: Prior bilateral cataract surgery. Visualized portions of the orbits are otherwise unremarkable. Mild mucosal thickening of the ethmoid air cells. Mild nasoseptal deviation to the right anteriorly. The middle ear and   mastoid air cells are clear.    BONES/SOFT TISSUES: No acute abnormality.    CERVICAL SPINE CT:   VERTEBRA: Mild straightening of the normal cervical lordosis. 2 mm spondylolisthesis of C2 on C3 and C3 on C4. 1 mm spondylolisthesis of C6 and C7. Craniocervical junction is within normal limits. Mild chronic vertebral body height loss of T2. The rest   of the vertebral body heights are maintained. No other fractures.    CANAL/FORAMINA: Moderate to severe intervertebral disc height loss at C4-C5 and C5-C6. Circumferential disc osteophyte complex at C4-C5 and C5-C6 causing at least mild spinal canal narrowing. Mild bilateral neuroforaminal  narrowing at C3-C4. Moderate   bilateral neuroforaminal narrowing at C4-C5 and C5-C6.    PARASPINAL: Moderate atherosclerotic calcifications of the carotid bulbs. Visualized lung fields are clear.      Impression    IMPRESSION:  HEAD CT:  1.  No intracranial hemorrhage, mass lesions, hydrocephalus or CT evidence for an acute infarct.  2.  Chronic infarcts as detailed above.  3.  Mild diffuse cerebral parenchymal volume loss. Presumed chronic hypertensive/microvascular ischemic white matter changes.    CERVICAL SPINE CT:  1.  No CT evidence for acute fracture or post traumatic subluxation.  2.  Mild chronic superior endplate compression deformity of T2.   Cervical spine CT w/o contrast    Narrative    EXAM: CT HEAD W/O CONTRAST, CT CERVICAL SPINE W/O CONTRAST  LOCATION: [facility]  DATE/TIME: 9/8/2022 1:24 PM    INDICATION: Fall, head injury, trauma.   COMPARISON: Head CT and cervical spine CT 07/30/2021  TECHNIQUE:   1) Routine CT Head without IV contrast. Multiplanar reformats. Dose reduction techniques were used.  2) Routine CT Cervical Spine without IV contrast. Multiplanar reformats. Dose reduction techniques were used.    FINDINGS:   HEAD CT:   INTRACRANIAL CONTENTS: No intracranial hemorrhage. Mild diffuse cerebral parenchymal volume loss. No midline shift. The basilar cisterns are patent. Large area of encephalomalacia involving the left temporal lobe and the left parietal lobe. There is   associated ex vacuo dilatation of the left lateral ventricle. Mild periventricular and scattered foci of deep white matter hypodensities involving both cerebral hemispheres. Chronic lacunae in the right caudate head. No CT evidence for an acute infarct.   Redemonstration of couple of punctate foci of hyperdensities within the subarachnoid spaces including the posterior right sylvian fissure, posterior right frontal lobe and the right cerebellar hemisphere.    VISUALIZED ORBITS/SINUSES/MASTOIDS:  Prior bilateral cataract surgery. Visualized portions of the orbits are otherwise unremarkable. Mild mucosal thickening of the ethmoid air cells. Mild nasoseptal deviation to the right anteriorly. The middle ear and   mastoid air cells are clear.    BONES/SOFT TISSUES: No acute abnormality.    CERVICAL SPINE CT:   VERTEBRA: Mild straightening of the normal cervical lordosis. 2 mm spondylolisthesis of C2 on C3 and C3 on C4. 1 mm spondylolisthesis of C6 and C7. Craniocervical junction is within normal limits. Mild chronic vertebral body height loss of T2. The rest   of the vertebral body heights are maintained. No other fractures.    CANAL/FORAMINA: Moderate to severe intervertebral disc height loss at C4-C5 and C5-C6. Circumferential disc osteophyte complex at C4-C5 and C5-C6 causing at least mild spinal canal narrowing. Mild bilateral neuroforaminal narrowing at C3-C4. Moderate   bilateral neuroforaminal narrowing at C4-C5 and C5-C6.    PARASPINAL: Moderate atherosclerotic calcifications of the carotid bulbs. Visualized lung fields are clear.      Impression    IMPRESSION:  HEAD CT:  1.  No intracranial hemorrhage, mass lesions, hydrocephalus or CT evidence for an acute infarct.  2.  Chronic infarcts as detailed above.  3.  Mild diffuse cerebral parenchymal volume loss. Presumed chronic hypertensive/microvascular ischemic white matter changes.    CERVICAL SPINE CT:  1.  No CT evidence for acute fracture or post traumatic subluxation.  2.  Mild chronic superior endplate compression deformity of T2.   XR Chest 1 View    Narrative    EXAM: XR CHEST 1 VIEW  LOCATION: [facility]  DATE/TIME: 9/8/2022 1:36 PM    INDICATION: Fall  COMPARISON: Portable supine view of the chest 01/20/2020      Impression    IMPRESSION:     Patient is slightly rotated RPO.    Cardiac silhouette is not enlarged. Mediastinal borders are well-defined. Mild linear wall calcification of the ascending aorta.    Lungs are  slightly underinflated but no focal airspace opacities or interstitial thickening.    No visible pleural fluid or pneumothorax on this single supine view.       XR Pelvis and Hip Right 2 Views    Narrative    EXAM: XR PELVIS AND HIP RIGHT 2 VIEWS  LOCATION: [facility]  DATE/TIME: 9/8/2022 1:36 PM    INDICATION: Fall, R hip pain, hx of prosthetic dislocation  COMPARISON: 01/20/2020.      Impression    IMPRESSION: Postoperative changes bilateral total hip replacements. Bones are demineralized. Dedicated views of the right hip show no acute periprosthetic fracture or dislocation.   XR Knee Right 3 Views    Narrative    EXAM: XR KNEE RIGHT 3 VIEWS  LOCATION: [facility]  DATE/TIME: 9/8/2022 5:21 PM    INDICATION: right knee pain, fall  COMPARISON: None.      Impression    IMPRESSION: Chondrocalcinosis medial and lateral compartment of the right knee joint. No evidence for fracture. No effusion.

## 2022-09-08 NOTE — DISCHARGE INSTRUCTIONS
Saira was seen in the emergency department today at [facility] emergency department after a fall.  We performed trauma imaging including evaluation of the hip and pelvis, chest, and head and cervical spine.  All of her trauma imaging was negative for significant injuries.  We do expect she will be sore after a fall like this.  Her labs today showed that she had a little bit of impairment in her kidney function slightly worse than last month.  She was given some fluids for this here and we would like to encourage her to drink plenty of liquids at home.  It would be a good idea to follow-up with her primary facility physician within the next couple of weeks, they may want to repeat her kidney function and can reevaluate any additional pain concerns at that time.

## 2022-09-08 NOTE — ED NOTES
EMERGENCY DEPARTMENT SIGN OUT NOTE        ED COURSE AND MEDICAL DECISION MAKING  Patient was signed out to me by Dr Milton Hamilton at 2:30 PM  4:16 PM I was notified by a nurse that the patient was in a rapid flutter, but converted to sinus bradycardia on her own  4:57 PM I was informed that the patient was unable to successfully walk a few steps before complaining of knee pain. Patient is normally able to walk with a walker  5:46 PM I met with the patient to discuss plan for admission   6:07 PM I spoke with Dr. Javier, [place], who agreed to accept the patient for admission     In brief, Saira Schultz is a 89 year old female who initially presented with injuries following a fall    Per nurse, the patient has dementia, and is normally up and ambulatory independently. This morning, a nurse at the [facility] checked on her, and 10 minutes later, found her on the ground on her her back, holding her right side. Patient is on blood thinners, with history of stroke and aphasia at baseline. Nurse states that patient was less attentive than usual.    At time of sign out, disposition was pending a successful road test    FINAL IMPRESSION    1. Fall, initial encounter    2. Acute kidney injury (H)    3. Difficulty walking        ED MEDS  Medications   aspirin (ASA) tablet 325 mg (has no administration in time range)   atorvastatin (LIPITOR) tablet 40 mg (40 mg Oral Given 9/8/22 2221)   clopidogrel (PLAVIX) tablet 75 mg (has no administration in time range)   escitalopram (LEXAPRO) tablet 10 mg (has no administration in time range)   gabapentin (NEURONTIN) capsule 200 mg (200 mg Oral Given 9/8/22 2221)   levothyroxine (SYNTHROID/LEVOTHROID) tablet 88 mcg (has no administration in time range)   losartan (COZAAR) tablet 50 mg ( Oral Automatically Held 9/12/22 0800)   metoprolol tartrate (LOPRESSOR) half-tab 12.5 mg (12.5 mg Oral Given 9/8/22 2221)   mirtazapine (REMERON) tablet 15 mg (15 mg Oral Given 9/8/22  2221)   lidocaine 1 % 0.1-1 mL (has no administration in time range)   lidocaine (LMX4) cream (has no administration in time range)   sodium chloride (PF) 0.9% PF flush 3 mL (3 mLs Intracatheter Not Given 9/8/22 2235)   sodium chloride (PF) 0.9% PF flush 3 mL (has no administration in time range)   melatonin tablet 1 mg (has no administration in time range)   Patient is already receiving anticoagulation with heparin, enoxaparin (LOVENOX), warfarin (COUMADIN)  or other anticoagulant medication (has no administration in time range)   acetaminophen (TYLENOL) tablet 975 mg (975 mg Oral Given 9/8/22 2221)   senna-docusate (SENOKOT-S/PERICOLACE) 8.6-50 MG per tablet 1 tablet (has no administration in time range)     Or   senna-docusate (SENOKOT-S/PERICOLACE) 8.6-50 MG per tablet 2 tablet (has no administration in time range)   ondansetron (ZOFRAN ODT) ODT tab 4 mg (has no administration in time range)     Or   ondansetron (ZOFRAN) injection 4 mg (has no administration in time range)   prochlorperazine (COMPAZINE) injection 5 mg (has no administration in time range)     Or   prochlorperazine (COMPAZINE) tablet 5 mg (has no administration in time range)     Or   prochlorperazine (COMPAZINE) suppository 12.5 mg (has no administration in time range)   lactated ringers infusion (has no administration in time range)   enoxaparin ANTICOAGULANT (LOVENOX) injection 30 mg (has no administration in time range)   acetaminophen (TYLENOL) tablet 650 mg (650 mg Oral Given 9/8/22 1255)   0.9% sodium chloride BOLUS (0 mLs Intravenous Stopped 9/8/22 1646)   acetaminophen (TYLENOL) tablet 975 mg (975 mg Oral Given 9/8/22 1758)   lactated ringers infusion ( Intravenous Rate/Dose Verify 9/8/22 2155)   HYDROmorphone (PF) (DILAUDID) injection 0.2 mg (0.2 mg Intravenous Given 9/8/22 1939)   hydrALAZINE (APRESOLINE) injection 10 mg (10 mg Intravenous Given 9/8/22 5374)       LAB  Labs Ordered and Resulted from Time of ED Arrival to Time of ED  Departure   BASIC METABOLIC PANEL - Abnormal       Result Value    Sodium 140      Potassium 5.8 (*)     Chloride 112 (*)     Carbon Dioxide (CO2) 20 (*)     Anion Gap 8      Urea Nitrogen 33 (*)     Creatinine 1.49 (*)     Calcium 8.6      Glucose 113      GFR Estimate 32 (*)    CBC WITH PLATELETS AND DIFFERENTIAL - Abnormal    WBC Count 8.5      RBC Count 3.28 (*)     Hemoglobin 10.3 (*)     Hematocrit 34.1 (*)      (*)     MCH 31.4      MCHC 30.2 (*)     RDW 14.4      Platelet Count 157      % Neutrophils 78      % Lymphocytes 14      % Monocytes 7      % Eosinophils 0      % Basophils 1      % Immature Granulocytes 0      NRBCs per 100 WBC 0      Absolute Neutrophils 6.7      Absolute Lymphocytes 1.2      Absolute Monocytes 0.6      Absolute Eosinophils 0.0      Absolute Basophils 0.0      Absolute Immature Granulocytes 0.0      Absolute NRBCs 0.0     COVID-19 VIRUS (CORONAVIRUS) BY PCR - Normal    SARS CoV2 PCR Negative     POTASSIUM - Normal    Potassium 4.9     CREATININE       RADIOLOGY    XR Knee Right 3 Views   Final Result   IMPRESSION: Chondrocalcinosis medial and lateral compartment of the right knee joint. No evidence for fracture. No effusion.      XR Pelvis and Hip Right 2 Views   Final Result   IMPRESSION: Postoperative changes bilateral total hip replacements. Bones are demineralized. Dedicated views of the right hip show no acute periprosthetic fracture or dislocation.      XR Chest 1 View   Final Result   IMPRESSION:       Patient is slightly rotated RPO.      Cardiac silhouette is not enlarged. Mediastinal borders are well-defined. Mild linear wall calcification of the ascending aorta.      Lungs are slightly underinflated but no focal airspace opacities or interstitial thickening.      No visible pleural fluid or pneumothorax on this single supine view.            Cervical spine CT w/o contrast   Final Result   IMPRESSION:   HEAD CT:   1.  No intracranial hemorrhage, mass lesions,  hydrocephalus or CT evidence for an acute infarct.   2.  Chronic infarcts as detailed above.   3.  Mild diffuse cerebral parenchymal volume loss. Presumed chronic hypertensive/microvascular ischemic white matter changes.      CERVICAL SPINE CT:   1.  No CT evidence for acute fracture or post traumatic subluxation.   2.  Mild chronic superior endplate compression deformity of T2.      Head CT w/o contrast   Final Result   IMPRESSION:   HEAD CT:   1.  No intracranial hemorrhage, mass lesions, hydrocephalus or CT evidence for an acute infarct.   2.  Chronic infarcts as detailed above.   3.  Mild diffuse cerebral parenchymal volume loss. Presumed chronic hypertensive/microvascular ischemic white matter changes.      CERVICAL SPINE CT:   1.  No CT evidence for acute fracture or post traumatic subluxation.   2.  Mild chronic superior endplate compression deformity of T2.      CT Knee Right w/o Contrast    (Results Pending)       DISCHARGE MEDS  New Prescriptions    No medications on file       Jason Malave MD  [facility] EMERGENCY DEPARTMENT  [address] [id number]  [phone]     Jason Malave MD  09/08/22 2193

## 2022-09-08 NOTE — ED PROVIDER NOTES
EMERGENCY DEPARTMENT ENCOUNTER      NAME: Saira Schultz  AGE: 89 year old female  YOB: 1929  MRN: [id number]  EVALUATION DATE & TIME: 2022 12:34 PM    PCP: Nhan April    ED PROVIDER: Keanu Hamilton M.D.      Chief Complaint   Patient presents with     Fall       FINAL IMPRESSION:  1. Fall, initial encounter    2. Acute kidney injury (H)        ED COURSE & MEDICAL DECISION MAKIN year old female presents to the Emergency Department for evaluation of unwitnessed fall at her facility.  Contacted facility.  Nurse there reports that she fell this morning during change of shift.  She was found down in her room after yelling for help.  At baseline she is able to walk with a walker.  She is vitally stable when she arrives to the emergency department.  Was seen as a trauma evaluation due to concern that she is on blood thinners, does take Plavix per her medication list.  She was evaluated quickly.  Does not have any evidence of overt head neck or thoracoabdominal trauma.  She is little bit uncomfortable with position changes but cannot seem to localize pain to any specific region.  She has intact range of motion at all major joints without any apparent discomfort.  Screening chest and right hip and pelvis films are negative for any fracture or dislocation.  Head and cervical spine imaging negative.  No other evidence of trauma on full evaluation here.  She did obtain labs and EKG given the unwitnessed  overall although relatively low suspicion for syncope.  EKG shows sinus rhythm.  Labs are notable for mild acute kidney injury, slightly progressed from last month, did administer a fluid bolus here in the event this might be prerenal and probably should be rechecked within the next few weeks to monitor stability.  Hemoglobin is stable.  I discussed things with the nursing home staff.  At this point we will be road testing her to ensure she is ambulatory and weightbearing and able to  return to her facility.  Anticipate that if this goes well, patient should be able to discharge home.      MEDICATIONS GIVEN IN THE EMERGENCY:  Medications   0.9% sodium chloride BOLUS (has no administration in time range)   acetaminophen (TYLENOL) tablet 650 mg (650 mg Oral Given 9/8/22 1255)       NEW PRESCRIPTIONS STARTED AT TODAY'S ER VISIT  New Prescriptions    No medications on file          =================================================================    HPI    Patient information was obtained from: Nurse, patient    Use of : N/A        Saira Schultz is a 89 year old female with a pertinent history of sepsis, CVA, dementia, DNR, CKD stage 3, hypertension, PVD, and varicose veins who presents to this ED via EMS for evaluation of a fall.    Per nurse, the patient has dementia, and is normally up and ambulatory independently. This morning, a nurse at the [facility] checked on her, and 10 minutes later, found her on the ground on her her back, holding her right side. Patient is on blood thinners, with history of stroke and aphasia at baseline. Nurse states that patient was less attentive than usual.    ROS limited due to patient's altered mental state.    Per patient, she initially denies leg pain, or any other pain, but afterwards cries out in pain with any movement.    REVIEW OF SYSTEMS   All systems reviewed and negative except as noted in HPI.    PAST MEDICAL HISTORY:  History reviewed. No pertinent past medical history.    PAST SURGICAL HISTORY:  History reviewed. No pertinent surgical history.        CURRENT MEDICATIONS:    Current Facility-Administered Medications   Medication     0.9% sodium chloride BOLUS     Current Outpatient Medications   Medication     acetaminophen (TYLENOL) 325 MG tablet     aspirin 325 MG tablet     atorvastatin (LIPITOR) 40 MG tablet     calcium, as carbonate, (TUMS) 200 mg calcium (500 mg) chewable tablet     cholecalciferol, vitamin D3, 1,000 unit  (25 mcg) tablet     clopidogrel (PLAVIX) 75 mg tablet     escitalopram oxalate (LEXAPRO) 5 MG tablet     ferrous sulfate 325 (65 FE) MG tablet     levothyroxine (SYNTHROID, LEVOTHROID) 88 MCG tablet     metoprolol tartrate (LOPRESSOR) 25 MG tablet     omeprazole (PRILOSEC) 20 MG capsule     ondansetron (ZOFRAN-ODT) 4 MG disintegrating tablet     polyethylene glycol (MIRALAX) 17 gram packet     senna-docusate (PERICOLACE) 8.6-50 mg tablet     triamcinolone (KENALOG) 0.1 % cream         ALLERGIES:  No Known Allergies    FAMILY HISTORY:  No family history on file.    SOCIAL HISTORY:   Social History     Socioeconomic History     Marital status:    Tobacco Use     Smoking status: Never Smoker   Substance and Sexual Activity     Alcohol use: No     Drug use: No   Social History Narrative    10/2019: She resides in [facility].        VITALS:  BP (!) 168/72   Pulse 50   Temp 98.8  F (37.1  C) (Oral)   Resp 22   SpO2 95%     PHYSICAL EXAM    Constitutional: Well-developed elderly female patient, sitting in bed, no acute distress  HENT: Normocephalic, Atraumatic. Neck Supple.  Eyes: EOMI, Conjunctiva normal.  Respiratory: Breathing comfortably on room air. Speaks full sentences easily. Lungs clear to ascultation.  Cardiovascular: Normal heart rate, Regular rhythm. No peripheral edema.  Abdomen: Soft, nontender  Musculoskeletal: Good range of motion in all major joints. No major deformities noted.  Integument: Warm, Dry.  Neurologic: Alert & awake, Normal motor function, Normal sensory function, No focal deficits noted.   Psychiatric: Cooperative. Affect appropriate.     LAB:  All pertinent labs reviewed and interpreted.  Labs Ordered and Resulted from Time of ED Arrival to Time of ED Departure   BASIC METABOLIC PANEL - Abnormal       Result Value    Sodium 140      Potassium 5.8 (*)     Chloride 112 (*)     Carbon Dioxide (CO2) 20 (*)     Anion Gap 8      Urea Nitrogen 33 (*)     Creatinine  1.49 (*)     Calcium 8.6      Glucose 113      GFR Estimate 32 (*)    CBC WITH PLATELETS AND DIFFERENTIAL - Abnormal    WBC Count 8.5      RBC Count 3.28 (*)     Hemoglobin 10.3 (*)     Hematocrit 34.1 (*)      (*)     MCH 31.4      MCHC 30.2 (*)     RDW 14.4      Platelet Count 157      % Neutrophils 78      % Lymphocytes 14      % Monocytes 7      % Eosinophils 0      % Basophils 1      % Immature Granulocytes 0      NRBCs per 100 WBC 0      Absolute Neutrophils 6.7      Absolute Lymphocytes 1.2      Absolute Monocytes 0.6      Absolute Eosinophils 0.0      Absolute Basophils 0.0      Absolute Immature Granulocytes 0.0      Absolute NRBCs 0.0         RADIOLOGY:  Reviewed all pertinent imaging. Please see official radiology report.  XR Pelvis and Hip Right 2 Views   Final Result   IMPRESSION: Postoperative changes bilateral total hip replacements. Bones are demineralized. Dedicated views of the right hip show no acute periprosthetic fracture or dislocation.      XR Chest 1 View   Final Result   IMPRESSION:       Patient is slightly rotated RPO.      Cardiac silhouette is not enlarged. Mediastinal borders are well-defined. Mild linear wall calcification of the ascending aorta.      Lungs are slightly underinflated but no focal airspace opacities or interstitial thickening.      No visible pleural fluid or pneumothorax on this single supine view.            Cervical spine CT w/o contrast   Final Result   IMPRESSION:   HEAD CT:   1.  No intracranial hemorrhage, mass lesions, hydrocephalus or CT evidence for an acute infarct.   2.  Chronic infarcts as detailed above.   3.  Mild diffuse cerebral parenchymal volume loss. Presumed chronic hypertensive/microvascular ischemic white matter changes.      CERVICAL SPINE CT:   1.  No CT evidence for acute fracture or post traumatic subluxation.   2.  Mild chronic superior endplate compression deformity of T2.      Head CT w/o contrast   Final Result   IMPRESSION:   HEAD  CT:   1.  No intracranial hemorrhage, mass lesions, hydrocephalus or CT evidence for an acute infarct.   2.  Chronic infarcts as detailed above.   3.  Mild diffuse cerebral parenchymal volume loss. Presumed chronic hypertensive/microvascular ischemic white matter changes.      CERVICAL SPINE CT:   1.  No CT evidence for acute fracture or post traumatic subluxation.   2.  Mild chronic superior endplate compression deformity of T2.          EKG:    Performed at: 1245    Impression: Sinus bradycardia, first degree AV block, left axis deviation, incomplete LBBB    Rate: 53  Rhythm: Sinus bradycardia  Axis: Leftward  NE Interval: 310  QRS Interval: 106  QTc Interval: 450  ST Changes: None significant  Comparison: Compared to July 30, 2021, mild change in proximal access, no other significant changes    I have independently reviewed and interpreted the EKG(s) documented above.        I, Jossy Card, am serving as a scribe to document services personally performed by Dr. Keanu Hamilton, based on my observation and the provider's statements to me. I, Keanu Hamilton MD attest that Jossy Card is acting in a scribe capacity, has observed my performance of the services and has documented them in accordance with my direction.    Keanu Hamilton M.D.  Emergency Medicine  [facility] EMERGENCY DEPARTMENT  [facility] [facility] [id number] [phone]  Dept: [phone]       Keanu Hamilton MD  09/08/22 1424       Keanu Hamilton MD  09/08/22 1425       Keanu Hamilton MD  09/08/22 6187

## 2022-09-09 NOTE — PLAN OF CARE
Spoke with Kayce from [facility].  Says patient is usually independent at baseline and ambulates with a walker.  Kayce can be reached directly by cell phone at [phone] for updates or questions.

## 2022-09-09 NOTE — PROGRESS NOTES
"   09/09/22 0730   Quick Adds   Type of Visit Initial PT Evaluation   Living Environment   People in Home facility resident   Current Living Arrangements extended care facility  (memory care)   Home Accessibility no concerns   Self-Care   Equipment Currently Used at Home walker, rolling   Activity/Exercise/Self-Care Comment Above information obtained via chart review. Pt unable to provide PLOF due to aphasia. Pt is from memory care at baseline. Per chart review, independent with ambulation.   General Information   Onset of Illness/Injury or Date of Surgery 09/08/22   Referring Physician Fanny Javier MD   Patient/Family Therapy Goals Statement (PT) None stated.   Pertinent History of Current Problem (include personal factors and/or comorbidities that impact the POC) Per H&P: \"89 year old female with a pertinent history of sepsis, CVA, dementia, CKD stage 3, hypertension, PVD, and varicose veins who presents to this ED via EMS for evaluation of a fall at her assisted living facility. Per nurse at her facility, the patient has dementia at baseline but is usually ambulatory independently. This morning, a nurse at the [facility] facility checked on her after hearing a loud sound concerning for a fall. Patient was found down on her right side. Denies chest pain, dizziness, or shortness of breath prior to fall. Patient is on blood thinners, with history of stroke and aphasia at baseline. Nurse states that patient was less attentive than usual.\"   Existing Precautions/Restrictions fall   General Observations Per chart review, pt has h/o CVA with aphasia.   Range of Motion (ROM)   Range of Motion ROM is WFL   Strength (Manual Muscle Testing)   Strength (Manual Muscle Testing) Deficits observed during functional mobility   Bed Mobility   Bed Mobility supine-sit;sit-supine   Supine-Sit [place] (Bed Mobility) moderate assist (50% patient effort);verbal cues   Sit-Supine [place] (Bed Mobility) moderate assist (50% " patient effort);verbal cues   Bed Mobility Limitations decreased ability to use arms for pushing/pulling;decreased ability to use legs for bridging/pushing;cognitive deficits   Impairments Contributing to Impaired Bed Mobility decreased strength;impaired balance   Transfers   Transfers sit-stand transfer   Transfer Safety Concerns Noted decreased weight-shifting ability   Impairments Contributing to Impaired Transfers impaired balance;decreased strength   Sit-Stand Transfer   Sit-Stand [place] (Transfers) minimum assist (75% patient effort);verbal cues   Assistive Device (Sit-Stand Transfers) walker, front-wheeled   Gait/Stairs (Locomotion)   [place] Level (Gait) minimum assist (75% patient effort);verbal cues   Assistive Device (Gait) walker, front-wheeled   Distance in Feet (Required for LE Total Joints) 3'   Pattern (Gait) step-to   Deviations/Abnormal Patterns (Gait) antalgic;lora decreased;gait speed decreased   Clinical Impression   Criteria for Skilled Therapeutic Intervention Yes, treatment indicated   PT Diagnosis (PT) impaired functional mobility   Influenced by the following impairments decreased strength, impaired balance, pain   Functional limitations due to impairments transfers, ambulation   Clinical Presentation (PT Evaluation Complexity) Evolving/Changing   Clinical Presentation Rationale Pt presents as medically diagnosed.   Clinical Decision Making (Complexity) moderate complexity   Planned Therapy Interventions (PT) balance training;bed mobility training;gait training;home exercise program;neuromuscular re-education;patient/family education;strengthening;transfer training   Risk & Benefits of therapy have been explained evaluation/treatment results reviewed;participants voiced agreement with care plan;participants included;patient   PT Discharge Planning   PT Discharge Recommendation (DC Rec) home with assist;home with home care physical therapy   PT Rationale for DC Rec Recommend  assist of 1 with ambulation and all ADLs. If patient unable to receive this level of assist at , may need TCU.   Total Evaluation Time   Total Evaluation Time (Minutes) 10   Physical Therapy Goals   PT Frequency Daily   PT Predicted Duration/Target Date for Goal Attainment 09/16/22   PT Goals Bed Mobility;Transfers;Gait   PT: Bed Mobility Minimal assist;Supine to/from sit   PT: Transfers Supervision/stand-by assist;Sit to/from stand;Assistive device   PT: Gait Supervision/stand-by assist;Assistive device;Rolling walker;10 feet     Juanita Ying, PT  9/9/2022

## 2022-09-09 NOTE — PLAN OF CARE
Physical Therapy Discharge Summary    Reason for therapy discharge:    Discharged to home with home therapy.    Progress towards therapy goal(s). See goals on Care Plan in [facility] electronic health record for goal details.  Goals partially met.  Barriers to achieving goals:   discharge on same date as initial evaluation.    Therapy recommendation(s):    Continued therapy is recommended.  Rationale/Recommendations:  recommend home PT. Recommend assist of 1 with all mobility.     Juanita Ying, PT  9/9/2022

## 2022-09-09 NOTE — PLAN OF CARE
PRIMARY DIAGNOSIS: GENERALIZED WEAKNESS    OUTPATIENT/OBSERVATION GOALS TO BE MET BEFORE DISCHARGE  1. Orthostatic performed: No    2. Tolerating PO medications: Yes    3. Return to near baseline physical activity: Yes    4. Cleared for discharge by consultants (if involved): No    Discharge Planner Nurse   Safe discharge environment identified:  pending PT/OT  Barriers to discharge: Yes       Entered by: Nancy K Zollinger, RN 09/09/2022 11:59 AM     Please review provider order for any additional goals.   Nurse to notify provider when observation goals have been met and patient is ready for discharge.Goal Outcome Evaluation:  Unable to assess orientation level due to her chronic aphasia.  Panda removed at 1030.  Patient hasn't urinated yet.  Ate 50% of breakfast.

## 2022-09-09 NOTE — PROGRESS NOTES
Care Management Discharge Note    Discharge Date: 09/09/2022       Discharge Disposition: Assisted Living    Discharge Services: None    Discharge DME: None    Discharge Transportation:  [facility]      Education Provided on the Discharge Plan:  For [facility]  Persons Notified of Discharge Plans: unable to reach family      Patient/Family in Agreement with the Plan:  (unable to reach daughter)    Handoff Referral Completed: yes    Additional Information:    Unable to reach daughter-Janelle. Per [facility], she is camping in remote area. Spoke with Shanda at [facility] and they can accept patient back today. They already have orders for PT/OT and will obtain home care.  Orders faxed.  Patient will return via [facility]. Orders faxed.     Unable to review Moon/Obs information. Patient is not able to understand information and daughter not reachable.    2:07 PM Orders updated and re-faxed. Notified Shanda of discharge and updated orders.        Katelynn Garcia RN

## 2022-09-09 NOTE — ED NOTES
Pt is disheveled. She is laying on a hover mat that is bunched up underneath her. With the help of another nurse, I have placed a sheet underneath her and straightened the hover mat. Saira tolerated this just fine.

## 2022-09-09 NOTE — PROGRESS NOTES
[facility]      OUTPATIENT OCCUPATIONAL THERAPY  EVALUATION  PLAN OF TREATMENT FOR OUTPATIENT REHABILITATION  (COMPLETE FOR INITIAL CLAIMS ONLY)  Patient's Last Name, First Name, M.I.  YOB: 1929  Saira Schultz                          Provider's Name  [facility] Medical Record No.  [id number]                               Onset Date:  09/08/22   Start of Care Date:  09/08/22     Type:     ___PT   _X_OT   ___SLP Medical Diagnosis:  Fall, difficulty walking                        OT Diagnosis:  Decreased ind with ADLs and safety   Visits from SOC:  1   _________________________________________________________________________________  Plan of Treatment/Functional Goals    Planned Interventions: ADL retraining, bed mobility training   Goals: See Occupational Therapy Goals on Care Plan in [employer] electronic health record.    Therapy Frequency: Daily  Predicted Duration of Therapy Intervention: 09/16/22  _________________________________________________________________________________    I CERTIFY THE NEED FOR THESE SERVICES FURNISHED UNDER        THIS PLAN OF TREATMENT AND WHILE UNDER MY CARE     (Physician co-signature of this document indicates review and certification of the therapy plan).              Certification date from: 09/09/22, Certification date to: 10/09/22    Referring Physician: Fanny Javier MD            Initial Assessment        See Occupational Therapy evaluation dated 09/08/22 in Epic electronic health record.

## 2022-09-09 NOTE — DISCHARGE SUMMARY
[facility]  Discharge Summary - Medicine & Pediatrics       Date of Admission:  9/8/2022  Date of Discharge:  9/9/2022  Discharging Provider: Carol Bermudez MD  Discharge Service: Hospitalist Service    Discharge Diagnoses   The primary encounter diagnosis was Acute CVA (cerebrovascular accident) (H). Diagnoses of Fall, initial encounter, Acute kidney injury (H), and Difficulty walking were also pertinent to this visit.    Follow-ups Needed After Discharge   Follow-up Appointments     Follow-up and recommended labs and tests       Follow up with primary care provider, Jannet Justin, within 7 days for   hospital follow- up.  The following labs/tests are recommended: BMP to   evaluate kidney function.          Re-evaluate blood pressure - discontinued metoprolol due to bradycardia and potential for increasing risk of falls. Discontinued plavix and decreased aspirin from 325 mg to 81 mg as her stroke was almost 1 year ago.    Discharge Disposition   Discharged to memory care.  Condition at discharge: Stable    Hospital Course   Saira Schultz was admitted on 9/8/2022 for a fall.  The following problems were addressed during her hospitalization:    Fall   Decreased ADLs  Right knee pain   Patient is admitted from her assisted living facility where she reportedly had an unwitnessed fall. Sounds mechanical in nature; no shortness of breath, chest pain, or dizziness preceding fall. Screening imaging done on admission of chest, right hip, and pelvis are negative for any fracture or dislocation.  Head and cervical spine imaging negative. EKG obtained in ED to rule out arhythmia leading to syncope and reassuringly showed sinus rhythm. Patient was set to discharge home but unfortunately failed her ambulation/mobility test and it was felt she should be admitted due to decreased ADLs. Had increased pain in her right knee, negative for fracture on CT. She felt better the next morning and was able  to ambulate with PT.      Acute Kidney Injury, improved  Found to have creatinine of 1.49 (1.18 2 weeks prior) and BUN of 33, improved to 1.07 (48) with IV fluids. Her losartan was held. Recommend rechecking creatinine/BUN at PCP follow-up.     Urinary Retention, resolved  Upon admission to ED, patient unable to urinate and bladder scan showed 800mL retained fluid. Etiology unclear. Had garland catheter overnight. Able to pee this morning.     Hypertension  Known diagnosis of HTN. Knee pain, pain with repositioning, and urinary retention also likely contributing. On losartan and metoprolol for BP - discontinued metoprolol this admission due to bradycardia and potential for increasing her fall risk.      Hyperkalemia   Elevated potassium of 5.8 on admission. Rechecked and improved with fluids.     Consultations This Hospital Stay   OCCUPATIONAL THERAPY ADULT IP CONSULT  PHYSICAL THERAPY ADULT IP CONSULT  CARE MANAGEMENT / SOCIAL WORK IP CONSULT    Code Status   No CPR- Do NOT Intubate     The patient was discussed with Dr. Rosenstein.    Carol Bermudez MD  [facility], PGY-2  Pager #: [phone]  ______________________________________________________________________    Physical Exam   Vital Signs: Temp: 99.1  F (37.3  C) Temp src: Oral BP: (!) 160/74 Pulse: 70   Resp: 16 SpO2: 96 % O2 Device: None (Room air)    Weight: 150 lbs 0 oz   GEN: Patient laying comfortably in no acute distress.  HEEN: Head is atraumatic, normocephalic, eyes anicteric, mucous membranes moist.  CV: Regular rate and rhythm without obvious murmurs.  PULM: Clear to auscultation bilaterally without wheezing or rales.  ABD: Soft, nontender, bowel sounds present.  NEURO: Alert, fluent expressive aphasia. Face symmetric.  PSYCH: Appropriate affect.  SKIN: No rashes, bruising, or other lesions.      Primary Care Physician   Jannet Justin    Discharge Orders      Reason for your hospital stay    You were  hospitalized after a fall. You were found to not have any significant injuries from your fall.     Follow-up and recommended labs and tests     Follow up with primary care provider, Jannet Justin, within 7 days for hospital follow- up.  The following labs/tests are recommended: BMP to evaluate kidney function.     Activity    Your activity upon discharge: activity as tolerated     Diet    Follow this diet upon discharge: Regular       Significant Results and Procedures   Most Recent 3 CBC's:  Recent Labs   Lab Test 09/09/22  0719 09/08/22  1340 08/24/22  0926 02/02/22  1135   WBC 6.6 8.5  --  6.3   HGB 9.9* 10.3* 10.6* 10.5*   * 104*  --  102*    157  --  195     Most Recent 3 BMP's:  Recent Labs   Lab Test 09/09/22 0719 09/08/22  1846 09/08/22  1340 08/24/22  0926     --  140 144   POTASSIUM 4.7 4.9 5.8* 4.5   CHLORIDE 113*  --  112* 112*   CO2 19*  --  20* 25   BUN 26  --  33* 27.4*   CR 1.07 1.27* 1.49* 1.18*   ANIONGAP 6  --  8 7   NIYA 8.3*  --  8.6 9.0   GLC 79  --  113 87   ,   Results for orders placed or performed during the hospital encounter of 09/08/22   XR Pelvis and Hip Right 2 Views    Narrative    EXAM: XR PELVIS AND HIP RIGHT 2 VIEWS  LOCATION: [facility]  DATE/TIME: 9/8/2022 1:36 PM    INDICATION: Fall, R hip pain, hx of prosthetic dislocation  COMPARISON: 01/20/2020.      Impression    IMPRESSION: Postoperative changes bilateral total hip replacements. Bones are demineralized. Dedicated views of the right hip show no acute periprosthetic fracture or dislocation.   XR Chest 1 View    Narrative    EXAM: XR CHEST 1 VIEW  LOCATION: [facility]  DATE/TIME: 9/8/2022 1:36 PM    INDICATION: Fall  COMPARISON: Portable supine view of the chest 01/20/2020      Impression    IMPRESSION:     Patient is slightly rotated RPO.    Cardiac silhouette is not enlarged. Mediastinal borders are well-defined. Mild linear wall calcification of the  ascending aorta.    Lungs are slightly underinflated but no focal airspace opacities or interstitial thickening.    No visible pleural fluid or pneumothorax on this single supine view.       Head CT w/o contrast    Narrative    EXAM: CT HEAD W/O CONTRAST, CT CERVICAL SPINE W/O CONTRAST  LOCATION: [facility]  DATE/TIME: 9/8/2022 1:24 PM    INDICATION: Fall, head injury, trauma.   COMPARISON: Head CT and cervical spine CT 07/30/2021  TECHNIQUE:   1) Routine CT Head without IV contrast. Multiplanar reformats. Dose reduction techniques were used.  2) Routine CT Cervical Spine without IV contrast. Multiplanar reformats. Dose reduction techniques were used.    FINDINGS:   HEAD CT:   INTRACRANIAL CONTENTS: No intracranial hemorrhage. Mild diffuse cerebral parenchymal volume loss. No midline shift. The basilar cisterns are patent. Large area of encephalomalacia involving the left temporal lobe and the left parietal lobe. There is   associated ex vacuo dilatation of the left lateral ventricle. Mild periventricular and scattered foci of deep white matter hypodensities involving both cerebral hemispheres. Chronic lacunae in the right caudate head. No CT evidence for an acute infarct.   Redemonstration of couple of punctate foci of hyperdensities within the subarachnoid spaces including the posterior right sylvian fissure, posterior right frontal lobe and the right cerebellar hemisphere.    VISUALIZED ORBITS/SINUSES/MASTOIDS: Prior bilateral cataract surgery. Visualized portions of the orbits are otherwise unremarkable. Mild mucosal thickening of the ethmoid air cells. Mild nasoseptal deviation to the right anteriorly. The middle ear and   mastoid air cells are clear.    BONES/SOFT TISSUES: No acute abnormality.    CERVICAL SPINE CT:   VERTEBRA: Mild straightening of the normal cervical lordosis. 2 mm spondylolisthesis of C2 on C3 and C3 on C4. 1 mm spondylolisthesis of C6 and C7. Craniocervical  junction is within normal limits. Mild chronic vertebral body height loss of T2. The rest   of the vertebral body heights are maintained. No other fractures.    CANAL/FORAMINA: Moderate to severe intervertebral disc height loss at C4-C5 and C5-C6. Circumferential disc osteophyte complex at C4-C5 and C5-C6 causing at least mild spinal canal narrowing. Mild bilateral neuroforaminal narrowing at C3-C4. Moderate   bilateral neuroforaminal narrowing at C4-C5 and C5-C6.    PARASPINAL: Moderate atherosclerotic calcifications of the carotid bulbs. Visualized lung fields are clear.      Impression    IMPRESSION:  HEAD CT:  1.  No intracranial hemorrhage, mass lesions, hydrocephalus or CT evidence for an acute infarct.  2.  Chronic infarcts as detailed above.  3.  Mild diffuse cerebral parenchymal volume loss. Presumed chronic hypertensive/microvascular ischemic white matter changes.    CERVICAL SPINE CT:  1.  No CT evidence for acute fracture or post traumatic subluxation.  2.  Mild chronic superior endplate compression deformity of T2.   Cervical spine CT w/o contrast    Narrative    EXAM: CT HEAD W/O CONTRAST, CT CERVICAL SPINE W/O CONTRAST  LOCATION: [facility]  DATE/TIME: 9/8/2022 1:24 PM    INDICATION: Fall, head injury, trauma.   COMPARISON: Head CT and cervical spine CT 07/30/2021  TECHNIQUE:   1) Routine CT Head without IV contrast. Multiplanar reformats. Dose reduction techniques were used.  2) Routine CT Cervical Spine without IV contrast. Multiplanar reformats. Dose reduction techniques were used.    FINDINGS:   HEAD CT:   INTRACRANIAL CONTENTS: No intracranial hemorrhage. Mild diffuse cerebral parenchymal volume loss. No midline shift. The basilar cisterns are patent. Large area of encephalomalacia involving the left temporal lobe and the left parietal lobe. There is   associated ex vacuo dilatation of the left lateral ventricle. Mild periventricular and scattered foci of deep white  matter hypodensities involving both cerebral hemispheres. Chronic lacunae in the right caudate head. No CT evidence for an acute infarct.   Redemonstration of couple of punctate foci of hyperdensities within the subarachnoid spaces including the posterior right sylvian fissure, posterior right frontal lobe and the right cerebellar hemisphere.    VISUALIZED ORBITS/SINUSES/MASTOIDS: Prior bilateral cataract surgery. Visualized portions of the orbits are otherwise unremarkable. Mild mucosal thickening of the ethmoid air cells. Mild nasoseptal deviation to the right anteriorly. The middle ear and   mastoid air cells are clear.    BONES/SOFT TISSUES: No acute abnormality.    CERVICAL SPINE CT:   VERTEBRA: Mild straightening of the normal cervical lordosis. 2 mm spondylolisthesis of C2 on C3 and C3 on C4. 1 mm spondylolisthesis of C6 and C7. Craniocervical junction is within normal limits. Mild chronic vertebral body height loss of T2. The rest   of the vertebral body heights are maintained. No other fractures.    CANAL/FORAMINA: Moderate to severe intervertebral disc height loss at C4-C5 and C5-C6. Circumferential disc osteophyte complex at C4-C5 and C5-C6 causing at least mild spinal canal narrowing. Mild bilateral neuroforaminal narrowing at C3-C4. Moderate   bilateral neuroforaminal narrowing at C4-C5 and C5-C6.    PARASPINAL: Moderate atherosclerotic calcifications of the carotid bulbs. Visualized lung fields are clear.      Impression    IMPRESSION:  HEAD CT:  1.  No intracranial hemorrhage, mass lesions, hydrocephalus or CT evidence for an acute infarct.  2.  Chronic infarcts as detailed above.  3.  Mild diffuse cerebral parenchymal volume loss. Presumed chronic hypertensive/microvascular ischemic white matter changes.    CERVICAL SPINE CT:  1.  No CT evidence for acute fracture or post traumatic subluxation.  2.  Mild chronic superior endplate compression deformity of T2.   XR Knee Right 3 Views    Narrative     EXAM: XR KNEE RIGHT 3 VIEWS  LOCATION: [facility]  DATE/TIME: 9/8/2022 5:21 PM    INDICATION: right knee pain, fall  COMPARISON: None.      Impression    IMPRESSION: Chondrocalcinosis medial and lateral compartment of the right knee joint. No evidence for fracture. No effusion.   CT Knee Right w/o Contrast    Narrative    EXAM: CT KNEE RIGHT W/O CONTRAST  LOCATION: [facility]  DATE/TIME: 9/8/2022 10:56 PM    INDICATION: R knee with significant pain, no evidence of fracture of effusion on XR right knee  COMPARISON: None.  TECHNIQUE: Noncontrast. Axial, sagittal and coronal thin-section reconstruction. Dose reduction techniques were used.     FINDINGS:     BONES:  -The bones are moderately osteopenic. No acute fracture or malalignment is identified. A femoral fixation gunner is partially visualized in the distal right femur.    SOFT TISSUES:  -Chondrocalcinosis of the patella, femoral trochlea, and medial lateral joint compartments as well as the medial lateral menisci are noted. No joint effusion is present. Extensor mechanism is intact.      Impression    IMPRESSION:Moderate Osteopenia of the bony structures without evidence of acute fracture or malalignment of the right knee, there is continued concern for occult injury MRI could be obtained as is more sensitive for evaluation of occult fracture in an   osteopenic patient           Discharge Medications   Current Discharge Medication List      START taking these medications    Details   aspirin (ASA) 81 MG EC tablet Take 1 tablet (81 mg) by mouth daily  Qty: 90 tablet, Refills: 4    Associated Diagnoses: Acute CVA (cerebrovascular accident) (H)         CONTINUE these medications which have NOT CHANGED    Details   acetaminophen (TYLENOL) 325 MG tablet [ACETAMINOPHEN (TYLENOL) 325 MG TABLET] Take 975 mg by mouth 3 (three) times a day.             atorvastatin (LIPITOR) 40 MG tablet [ATORVASTATIN (LIPITOR) 40 MG TABLET]  "Take 40 mg by mouth at bedtime.      cholecalciferol, vitamin D3, 1,000 unit (25 mcg) tablet [CHOLECALCIFEROL, VITAMIN D3, 1,000 UNIT (25 MCG) TABLET] Take 2,000 Units by mouth at bedtime.      escitalopram (LEXAPRO) 10 MG tablet Take 10 mg by mouth daily      !! gabapentin (NEURONTIN) 100 MG capsule Take 200 mg by mouth 3 times daily      !! gabapentin (NEURONTIN) 100 MG capsule Take 100 mg by mouth as needed for other (\"Agitation\")      ibuprofen (ADVIL/MOTRIN) 400 MG tablet Take 400 mg by mouth every 6 hours as needed for moderate pain      levothyroxine (SYNTHROID, LEVOTHROID) 88 MCG tablet [LEVOTHYROXINE (SYNTHROID, LEVOTHROID) 88 MCG TABLET] Take 88 mcg by mouth Daily at 6:00 am.      loperamide (IMODIUM) 2 MG capsule Take 2 mg by mouth as needed for diarrhea      losartan (COZAAR) 50 MG tablet Take 50 mg by mouth daily      mirtazapine (REMERON) 15 MG tablet Take 15 mg by mouth At Bedtime      omeprazole (PRILOSEC) 40 MG DR capsule Take 40 mg by mouth daily      polyethylene glycol (MIRALAX) 17 gram packet [POLYETHYLENE GLYCOL (MIRALAX) 17 GRAM PACKET] Take 17 g by mouth daily.      senna-docusate (PERICOLACE) 8.6-50 mg tablet [SENNA-DOCUSATE (PERICOLACE) 8.6-50 MG TABLET] Take 2 tablets by mouth at bedtime.  Refills: 0    Associated Diagnoses: Proctitis      traMADol (ULTRAM) 50 MG tablet Take 50 mg by mouth 4 times daily as needed for severe pain       !! - Potential duplicate medications found. Please discuss with provider.      STOP taking these medications       aspirin 325 MG tablet Comments:   Reason for Stopping:         clopidogrel (PLAVIX) 75 mg tablet Comments:   Reason for Stopping:         metoprolol tartrate (LOPRESSOR) 25 MG tablet Comments:   Reason for Stopping:             Allergies   No Known Allergies  "

## 2022-09-09 NOTE — ED NOTES
Bed: JNED-23  Expected date:   Expected time:   Means of arrival:   Comments:  Kisha pt ED 2 C.M.

## 2022-09-09 NOTE — PROGRESS NOTES
Occupational Therapy Discharge Summary    Reason for therapy discharge:    Discharged to home with home therapy.    Progress towards therapy goal(s). See goals on Care Plan in [facility] electronic health record for goal details.  Goals partially met.  Barriers to achieving goals:   discharge from facility.    Therapy recommendation(s):    Continued therapy is recommended.  Rationale/Recommendations:  Home OT to increase independence with ADLs and strength.    Karin Knight, RADHA OTD  9/9/22

## 2022-09-09 NOTE — PROGRESS NOTES
[facility]      OUTPATIENT PHYSICAL THERAPY EVALUATION  PLAN OF TREATMENT FOR OUTPATIENT REHABILITATION  (COMPLETE FOR INITIAL CLAIMS ONLY)  Patient's Last Name, First Name, M.I.  YOB: 1929  Saira Schultz                        Provider's Name  [facility] Medical Record No.  [id number]                               Onset Date:  09/08/22   Start of Care Date:  09/09/22      Type:     _X_PT   ___OT   ___SLP Medical Diagnosis:  (P) acute kidney injury                        PT Diagnosis:  impaired functional mobility   Visits from SOC:  1   _________________________________________________________________________________  Plan of Treatment/Functional Goals    Planned Interventions: balance training, bed mobility training, gait training, home exercise program, neuromuscular re-education, patient/family education, strengthening, transfer training     Goals: See Physical Therapy Goals on Care Plan in [employer] electronic health record.    Therapy Frequency: Daily  Predicted Duration of Therapy Intervention: 09/16/22  _________________________________________________________________________________    I CERTIFY THE NEED FOR THESE SERVICES FURNISHED UNDER        THIS PLAN OF TREATMENT AND WHILE UNDER MY CARE     (Physician co-signature of this document indicates review and certification of the therapy plan).              Certification date from: 09/09/22, Certification date to: 09/16/22    Referring Physician: Fanny Javier MD            Initial Assessment        See Physical Therapy evaluation dated 09/09/22 in Epic electronic health record.

## 2022-09-09 NOTE — PROGRESS NOTES
Chart assessed, lives at [facility] in [facility] assisted living facility [phone]: [phone] spoke to Shanda POSADAS, updated on plans for admission. Shanda left multiple message w/dtr Janelle Bauman and no response yet. Pt is unable to assist w/information. Pt uses [facility] and will need transport at discharge.

## 2022-09-09 NOTE — PHARMACY-ADMISSION MEDICATION HISTORY
"Pharmacy Note - Admission Medication History    Pertinent Provider Information: Medication information from [facility] medication list.     ______________________________________________________________________    Prior To Admission (PTA) med list completed and updated in EMR.       PTA Med List   Medication Sig Last Dose     acetaminophen (TYLENOL) 325 MG tablet [ACETAMINOPHEN (TYLENOL) 325 MG TABLET] Take 975 mg by mouth 3 (three) times a day.        9/8/2022 at 0930     aspirin 325 MG tablet [ASPIRIN 325 MG TABLET] Take 325 mg by mouth daily.  9/8/2022 at 0930     atorvastatin (LIPITOR) 40 MG tablet [ATORVASTATIN (LIPITOR) 40 MG TABLET] Take 40 mg by mouth at bedtime. 9/7/2022 at 2000     cholecalciferol, vitamin D3, 1,000 unit (25 mcg) tablet [CHOLECALCIFEROL, VITAMIN D3, 1,000 UNIT (25 MCG) TABLET] Take 2,000 Units by mouth at bedtime. 9/7/2022 at 2000     clopidogrel (PLAVIX) 75 mg tablet [CLOPIDOGREL (PLAVIX) 75 MG TABLET] Take 75 mg by mouth daily. 9/8/2022 at 0930     escitalopram (LEXAPRO) 10 MG tablet Take 10 mg by mouth daily 9/7/2022 at 0930     gabapentin (NEURONTIN) 100 MG capsule Take 200 mg by mouth 3 times daily 9/8/2022 at 0930     gabapentin (NEURONTIN) 100 MG capsule Take 100 mg by mouth as needed for other (\"Agitation\") Past Week at Unknown time     ibuprofen (ADVIL/MOTRIN) 400 MG tablet Take 400 mg by mouth every 6 hours as needed for moderate pain Past Week at Unknown time     levothyroxine (SYNTHROID, LEVOTHROID) 88 MCG tablet [LEVOTHYROXINE (SYNTHROID, LEVOTHROID) 88 MCG TABLET] Take 88 mcg by mouth Daily at 6:00 am. 9/8/2022 at 0730     loperamide (IMODIUM) 2 MG capsule Take 2 mg by mouth as needed for diarrhea Past Month at Unknown time     losartan (COZAAR) 50 MG tablet Take 50 mg by mouth daily 9/8/2022 at 0930     metoprolol tartrate (LOPRESSOR) 25 MG tablet [METOPROLOL TARTRATE (LOPRESSOR) 25 MG TABLET] Take 12.5 mg by mouth 2 (two) times a day. 9/8/2022 at 0930     " mirtazapine (REMERON) 15 MG tablet Take 15 mg by mouth At Bedtime 9/7/2022 at 2000     omeprazole (PRILOSEC) 40 MG DR capsule Take 40 mg by mouth daily 9/8/2022 at 0730     polyethylene glycol (MIRALAX) 17 gram packet [POLYETHYLENE GLYCOL (MIRALAX) 17 GRAM PACKET] Take 17 g by mouth daily. 9/8/2022 at 0930     senna-docusate (PERICOLACE) 8.6-50 mg tablet [SENNA-DOCUSATE (PERICOLACE) 8.6-50 MG TABLET] Take 2 tablets by mouth at bedtime. 9/7/2022 at 2000     traMADol (ULTRAM) 50 MG tablet Take 50 mg by mouth 4 times daily as needed for severe pain Past Week at Unknown time       Information source(s): Facility ([facility] medication list/MAR  Method of interview communication: N/A    Summary of Changes to PTA Med List  New: Gabapentin, Losartan, Mirtazapine, Ibuprofen, Tramadol, Loperamide  Discontinued: Calcium Carbonate, Ferrous sulfate, Ondansetron, Triamcinolone  Changed: Escitalopram 5 mg to 10 mg, Omeprazole 20 mg BID to 40 mg daily    Patient was asked about OTC/herbal products specifically.  PTA med list reflects this.    In the past week, patient estimated taking medication this percent of the time:  greater than 90%.    Allergies were reviewed, assessed, and updated with the patient.      Patient does not use any multi-dose medications prior to admission.    The information provided in this note is only as accurate as the sources available at the time of the update(s).    Thank you for the opportunity to participate in the care of this patient.    QUE SERRATO [facility]  9/8/2022 7:48 PM

## 2022-09-09 NOTE — PLAN OF CARE
PRIMARY DIAGNOSIS: ACUTE PAIN  OUTPATIENT/OBSERVATION GOALS TO BE MET BEFORE DISCHARGE:  1. Pain Status: Improved-controlled with oral pain medications.    2. Return to near baseline physical activity: No    3. Cleared for discharge by consultants (if involved): No    Discharge Planner Nurse   Safe discharge environment identified: NO, will need placement.   Barriers to discharge: Yes       Entered by: Vira Rm RN 09/09/2022 2:54 AM     Please review provider order for any additional goals.   Nurse to notify provider when observation goals have been met and patient is ready for discharge.Goal Outcome Evaluation:

## 2022-09-09 NOTE — PLAN OF CARE
PRIMARY DIAGNOSIS: ACUTE PAIN  OUTPATIENT/OBSERVATION GOALS TO BE MET BEFORE DISCHARGE:  1. Pain Status: Improved-controlled with oral pain medications.    2. Return to near baseline physical activity: No    3. Cleared for discharge by consultants (if involved): No    Discharge Planner Nurse   Safe discharge environment identified: No  Barriers to discharge: Yes       Entered by: Vira Rm RN 09/09/2022 7:00 AM     Please review provider order for any additional goals.   Nurse to notify provider when observation goals have been met and patient is ready for discharge.Goal Outcome Evaluation:

## 2022-09-09 NOTE — PLAN OF CARE
PRIMARY DIAGNOSIS: GENERALIZED WEAKNESS    OUTPATIENT/OBSERVATION GOALS TO BE MET BEFORE DISCHARGE  1. Orthostatic performed: No    2. Tolerating PO medications: Yes    3. Return to near baseline physical activity: Yes    4. Cleared for discharge by consultants (if involved): N/A    Discharge Planner Nurse   Safe discharge environment identified: Yes  Barriers to discharge: No       Entered by: Nancy K Zollinger, RN 09/09/2022 2:07 PM     Please review provider order for any additional goals.   Nurse to notify provider when observation goals have been met and patient is ready for discharge.Goal Outcome Evaluation:  Patient was able to urinate.  Discharging back to [facility].  Transported by [facility] via w/c.  Belongings sent with patient.

## 2022-09-09 NOTE — PLAN OF CARE
Found patient in her room trying to use the trash can to have a stool.  Had about a medium soft incontinent stool.  Assisted back to bed with [facility].  Not able to place a bed alarm d/t boarding in the ED.  Keeping room door and curtain open.  Rounding on patient often.

## 2022-09-10 NOTE — PROGRESS NOTES
Connected [facility]    Background: Transitional Care Management program auto-identified and prompting a chart review by [facility] team.    Assessment: Upon chart review, [facility] Team member will cancel/close this episode of Transitional Care Management program due to reason below:    Patient has discharged to a Memory Care, Nursing Home, Assisted Living or Group Home where patient is receiving on-site support with their daily cares, including support with hospital follow up plan.     Caller spoke with Pt's daughter who shared Pt is in memory care.    Plan: Transitional Care Management episode closed per reason above.      STERLING Watkins  Connected Care Resource [place], [facility]    *Connected Care Resource Team does NOT follow patient ongoing. Referrals are identified based on internal discharge reports and the outreach is to ensure patient has an understanding of their discharge instructions.